# Patient Record
Sex: MALE | Race: WHITE | NOT HISPANIC OR LATINO | URBAN - METROPOLITAN AREA
[De-identification: names, ages, dates, MRNs, and addresses within clinical notes are randomized per-mention and may not be internally consistent; named-entity substitution may affect disease eponyms.]

---

## 2021-08-31 ENCOUNTER — INPATIENT (INPATIENT)
Facility: HOSPITAL | Age: 26
LOS: 7 days | Discharge: HOME | End: 2021-09-08
Attending: STUDENT IN AN ORGANIZED HEALTH CARE EDUCATION/TRAINING PROGRAM | Admitting: PSYCHIATRY & NEUROLOGY
Payer: MEDICAID

## 2021-08-31 ENCOUNTER — OUTPATIENT (OUTPATIENT)
Dept: OUTPATIENT SERVICES | Facility: HOSPITAL | Age: 26
LOS: 1 days | Discharge: STILL ADMITTED OR ADMINISTRATIVE | End: 2021-08-31
Payer: COMMERCIAL

## 2021-08-31 VITALS
TEMPERATURE: 98 F | OXYGEN SATURATION: 97 % | HEART RATE: 86 BPM | RESPIRATION RATE: 20 BRPM | DIASTOLIC BLOOD PRESSURE: 74 MMHG | SYSTOLIC BLOOD PRESSURE: 148 MMHG

## 2021-08-31 DIAGNOSIS — F06.1 CATATONIC DISORDER DUE TO KNOWN PHYSIOLOGICAL CONDITION: ICD-10-CM

## 2021-08-31 DIAGNOSIS — F29 UNSPECIFIED PSYCHOSIS NOT DUE TO A SUBSTANCE OR KNOWN PHYSIOLOGICAL CONDITION: ICD-10-CM

## 2021-08-31 LAB
ALBUMIN SERPL ELPH-MCNC: 4.8 G/DL — SIGNIFICANT CHANGE UP (ref 3.3–5)
ALP SERPL-CCNC: 69 U/L — SIGNIFICANT CHANGE UP (ref 40–120)
ALT FLD-CCNC: 25 U/L — SIGNIFICANT CHANGE UP (ref 4–41)
AMPHET UR-MCNC: NEGATIVE — SIGNIFICANT CHANGE UP
ANION GAP SERPL CALC-SCNC: 15 MMOL/L — HIGH (ref 7–14)
APAP SERPL-MCNC: <15 UG/ML — SIGNIFICANT CHANGE UP (ref 15–25)
APPEARANCE UR: CLEAR — SIGNIFICANT CHANGE UP
AST SERPL-CCNC: 19 U/L — SIGNIFICANT CHANGE UP (ref 4–40)
BARBITURATES UR SCN-MCNC: NEGATIVE — SIGNIFICANT CHANGE UP
BASOPHILS # BLD AUTO: 0.02 K/UL — SIGNIFICANT CHANGE UP (ref 0–0.2)
BASOPHILS NFR BLD AUTO: 0.2 % — SIGNIFICANT CHANGE UP (ref 0–2)
BENZODIAZ UR-MCNC: NEGATIVE — SIGNIFICANT CHANGE UP
BILIRUB SERPL-MCNC: 0.2 MG/DL — SIGNIFICANT CHANGE UP (ref 0.2–1.2)
BILIRUB UR-MCNC: NEGATIVE — SIGNIFICANT CHANGE UP
BUN SERPL-MCNC: 15 MG/DL — SIGNIFICANT CHANGE UP (ref 7–23)
CALCIUM SERPL-MCNC: 9.6 MG/DL — SIGNIFICANT CHANGE UP (ref 8.4–10.5)
CHLORIDE SERPL-SCNC: 102 MMOL/L — SIGNIFICANT CHANGE UP (ref 98–107)
CK SERPL-CCNC: 109 U/L — SIGNIFICANT CHANGE UP (ref 30–200)
CO2 SERPL-SCNC: 22 MMOL/L — SIGNIFICANT CHANGE UP (ref 22–31)
COCAINE METAB.OTHER UR-MCNC: NEGATIVE — SIGNIFICANT CHANGE UP
COLOR SPEC: YELLOW — SIGNIFICANT CHANGE UP
CREAT SERPL-MCNC: 0.85 MG/DL — SIGNIFICANT CHANGE UP (ref 0.5–1.3)
CREATININE URINE RESULT, DAU: 123 MG/DL — SIGNIFICANT CHANGE UP
DIFF PNL FLD: NEGATIVE — SIGNIFICANT CHANGE UP
EOSINOPHIL # BLD AUTO: 0.09 K/UL — SIGNIFICANT CHANGE UP (ref 0–0.5)
EOSINOPHIL NFR BLD AUTO: 0.8 % — SIGNIFICANT CHANGE UP (ref 0–6)
ETHANOL SERPL-MCNC: <10 MG/DL — SIGNIFICANT CHANGE UP
GLUCOSE SERPL-MCNC: 113 MG/DL — HIGH (ref 70–99)
GLUCOSE UR QL: NEGATIVE — SIGNIFICANT CHANGE UP
HCT VFR BLD CALC: 40.3 % — SIGNIFICANT CHANGE UP (ref 39–50)
HGB BLD-MCNC: 13.8 G/DL — SIGNIFICANT CHANGE UP (ref 13–17)
IANC: 7.34 K/UL — SIGNIFICANT CHANGE UP (ref 1.5–8.5)
IMM GRANULOCYTES NFR BLD AUTO: 0.4 % — SIGNIFICANT CHANGE UP (ref 0–1.5)
KETONES UR-MCNC: NEGATIVE — SIGNIFICANT CHANGE UP
LEUKOCYTE ESTERASE UR-ACNC: NEGATIVE — SIGNIFICANT CHANGE UP
LYMPHOCYTES # BLD AUTO: 2.92 K/UL — SIGNIFICANT CHANGE UP (ref 1–3.3)
LYMPHOCYTES # BLD AUTO: 26 % — SIGNIFICANT CHANGE UP (ref 13–44)
MCHC RBC-ENTMCNC: 29.5 PG — SIGNIFICANT CHANGE UP (ref 27–34)
MCHC RBC-ENTMCNC: 34.2 GM/DL — SIGNIFICANT CHANGE UP (ref 32–36)
MCV RBC AUTO: 86.1 FL — SIGNIFICANT CHANGE UP (ref 80–100)
METHADONE UR-MCNC: NEGATIVE — SIGNIFICANT CHANGE UP
MONOCYTES # BLD AUTO: 0.84 K/UL — SIGNIFICANT CHANGE UP (ref 0–0.9)
MONOCYTES NFR BLD AUTO: 7.5 % — SIGNIFICANT CHANGE UP (ref 2–14)
NEUTROPHILS # BLD AUTO: 7.34 K/UL — SIGNIFICANT CHANGE UP (ref 1.8–7.4)
NEUTROPHILS NFR BLD AUTO: 65.1 % — SIGNIFICANT CHANGE UP (ref 43–77)
NITRITE UR-MCNC: NEGATIVE — SIGNIFICANT CHANGE UP
NRBC # BLD: 0 /100 WBCS — SIGNIFICANT CHANGE UP
NRBC # FLD: 0 K/UL — SIGNIFICANT CHANGE UP
OPIATES UR-MCNC: NEGATIVE — SIGNIFICANT CHANGE UP
OXYCODONE UR-MCNC: NEGATIVE — SIGNIFICANT CHANGE UP
PCP SPEC-MCNC: SIGNIFICANT CHANGE UP
PCP UR-MCNC: NEGATIVE — SIGNIFICANT CHANGE UP
PH UR: 6 — SIGNIFICANT CHANGE UP (ref 5–8)
PLATELET # BLD AUTO: 291 K/UL — SIGNIFICANT CHANGE UP (ref 150–400)
POTASSIUM SERPL-MCNC: 4 MMOL/L — SIGNIFICANT CHANGE UP (ref 3.5–5.3)
POTASSIUM SERPL-SCNC: 4 MMOL/L — SIGNIFICANT CHANGE UP (ref 3.5–5.3)
PROT SERPL-MCNC: 7.5 G/DL — SIGNIFICANT CHANGE UP (ref 6–8.3)
PROT UR-MCNC: NEGATIVE — SIGNIFICANT CHANGE UP
RBC # BLD: 4.68 M/UL — SIGNIFICANT CHANGE UP (ref 4.2–5.8)
RBC # FLD: 12.9 % — SIGNIFICANT CHANGE UP (ref 10.3–14.5)
SALICYLATES SERPL-MCNC: <5 MG/DL — LOW (ref 15–30)
SARS-COV-2 RNA SPEC QL NAA+PROBE: SIGNIFICANT CHANGE UP
SODIUM SERPL-SCNC: 139 MMOL/L — SIGNIFICANT CHANGE UP (ref 135–145)
SP GR SPEC: 1.02 — SIGNIFICANT CHANGE UP (ref 1–1.05)
THC UR QL: NEGATIVE — SIGNIFICANT CHANGE UP
TOXICOLOGY SCREEN, DRUGS OF ABUSE, SERUM RESULT: SIGNIFICANT CHANGE UP
TSH SERPL-MCNC: 2.93 UIU/ML — SIGNIFICANT CHANGE UP (ref 0.27–4.2)
UROBILINOGEN FLD QL: SIGNIFICANT CHANGE UP
WBC # BLD: 11.25 K/UL — HIGH (ref 3.8–10.5)
WBC # FLD AUTO: 11.25 K/UL — HIGH (ref 3.8–10.5)

## 2021-08-31 PROCEDURE — 99285 EMERGENCY DEPT VISIT HI MDM: CPT

## 2021-08-31 PROCEDURE — 99285 EMERGENCY DEPT VISIT HI MDM: CPT | Mod: 25

## 2021-08-31 PROCEDURE — 93010 ELECTROCARDIOGRAM REPORT: CPT

## 2021-08-31 PROCEDURE — 70450 CT HEAD/BRAIN W/O DYE: CPT | Mod: 26

## 2021-08-31 RX ORDER — HALOPERIDOL DECANOATE 100 MG/ML
5 INJECTION INTRAMUSCULAR EVERY 6 HOURS
Refills: 0 | Status: DISCONTINUED | OUTPATIENT
Start: 2021-08-31 | End: 2021-09-08

## 2021-08-31 RX ORDER — HALOPERIDOL DECANOATE 100 MG/ML
5 INJECTION INTRAMUSCULAR ONCE
Refills: 0 | Status: DISCONTINUED | OUTPATIENT
Start: 2021-08-31 | End: 2021-09-08

## 2021-08-31 RX ORDER — OLANZAPINE 15 MG/1
5 TABLET, FILM COATED ORAL AT BEDTIME
Refills: 0 | Status: DISCONTINUED | OUTPATIENT
Start: 2021-08-31 | End: 2021-09-03

## 2021-08-31 RX ADMIN — Medication 2 MILLIGRAM(S): at 17:31

## 2021-08-31 RX ADMIN — Medication 2 MILLIGRAM(S): at 18:39

## 2021-08-31 RX ADMIN — OLANZAPINE 5 MILLIGRAM(S): 15 TABLET, FILM COATED ORAL at 21:38

## 2021-08-31 RX ADMIN — Medication 2 MILLIGRAM(S): at 23:30

## 2021-08-31 NOTE — ED ADULT NURSE NOTE - OBJECTIVE STATEMENT
Break RN: pt received to  due to catatonia. Pt non-verbal at this time, refusing to answer any questions. Pt able to follow commands, calm/cooperative at this time. Pt is outpatient [facility] pt with hx of catatonia and SI. Pt appears comfortable, in NAD, respirations even/unlabored, flat affect noted. Report endorsed to primary RN Hung, labs to be drawn, pt to be medicated as per orders.

## 2021-08-31 NOTE — ED BEHAVIORAL HEALTH ASSESSMENT NOTE - LEGAL HISTORY
multiple arrests for driving recklessly, possession of marijuana, resisting arrest while manic Open Court Case - Sept 21 - [place], Oct 4 - [place]

## 2021-08-31 NOTE — ED BEHAVIORAL HEALTH ASSESSMENT NOTE - DESCRIPTION
none both parents , staring straight ahead. Exhibiting waxy flexibility. mutism, staring blankly. Received Ativan 2mg IM.   Vital Signs Last 24 Hrs  T(C): 36.6 (31 Aug 2021 16:09), Max: 36.6 (31 Aug 2021 16:09)  T(F): 97.8 (31 Aug 2021 16:09), Max: 97.8 (31 Aug 2021 16:09)  HR: 86 (31 Aug 2021 16:09) (86 - 86)  BP: 148/74 (31 Aug 2021 16:09) (148/74 - 148/74)  BP(mean): --  RR: 20 (31 Aug 2021 16:09) (20 - 20)  SpO2: 97% (31 Aug 2021 16:09) (97% - 97%) staring straight ahead. Exhibiting waxy flexibility. mutism, staring blankly. Received Ativan 2mg IM 530pm. Ativan 2mg po at 740pm.   Vital Signs Last 24 Hrs  T(C): 36.6 (31 Aug 2021 16:09), Max: 36.6 (31 Aug 2021 16:09)  T(F): 97.8 (31 Aug 2021 16:09), Max: 97.8 (31 Aug 2021 16:09)  HR: 86 (31 Aug 2021 16:09) (86 - 86)  BP: 148/74 (31 Aug 2021 16:09) (148/74 - 148/74)  BP(mean): --  RR: 20 (31 Aug 2021 16:09) (20 - 20)  SpO2: 97% (31 Aug 2021 16:09) (97% - 97%)    KESSLER-JOAQUIN CATATONIA RATING SCALE - 13

## 2021-08-31 NOTE — ED BEHAVIORAL HEALTH ASSESSMENT NOTE - NSACTIVEVENT_PSY_ALL_CORE
Triggering events leading to humiliation, shame, and/or despair (e.g., Loss of relationship, financial or health status) (real or anticipated)/Substance intoxication or withdrawal/Pending incarceration or homelessness

## 2021-08-31 NOTE — ED BEHAVIORAL HEALTH ASSESSMENT NOTE - HPI (INCLUDE ILLNESS QUALITY, SEVERITY, DURATION, TIMING, CONTEXT, MODIFYING FACTORS, ASSOCIATED SIGNS AND SYMPTOMS)
25 year old  M, domiciled alone in [place], non-caregiver, with no formal diagnosed psych history but has taken psych meds while incarcerated, possible past psych hospitalization in [place], no past SA/SI, +cannabis use, +alcohol use (none since incarceration), BIB EMS activated by the crisis clinic for catatonia evaluation.     Patient seen in intake and stared at writer. He did not answer any questions and did not blink. He was able to follow commands "change your clothes, empty out your pockets" and slowly walked to the back of the  area.   Ativan 2mg IM was ordered and given. Will reassess after dose. Collateral from Crisis Clinic and Aunt provides most information.     Laura - Crisis Clinic: When patient came in he was very rigid and looked catatonic. he wouldn't engage in english or Turkmen. Family said since he's been out of correction (was there for 6 weeks in [place]). He left one night with the car and drove and was pulled over for driving recklessly. For the last 3-4 days he looked like a vegetable. he wasn't responding to his aunt even while in the crisis clinic. He has been physically aggressive, talking about odd topics. Yesterday he was saying he wanted to throw himself out of a balcony window. Grandmother has to walk him. Usually lives alone in [place] after finishing College. He was learning Sinhala but noticed a change in behavior in the last year. Never has sought treatment in the past. Was taking Zyprexa 10mg and Hydroxyzine from correction.    Rosalind (aunt)  - Pt finished [employer] and in the last year all his strange behaviors started. He used to leave the house alone, wanted to steal families money and spend outrageous amounts. He ended up in correction in [place] bc he was in possession of marijuana and police resist and driving recklessly but family got him out after a few days in Oct 2021. He continued to exhibit bizarre behaviors and would email friends homicidal threats and post strange things on facebook. He also accumulated over 40K in debt and family does not know how. He had also traveled around the country a lot and once on facebook he posted psychiatric pills in the past from [place] (ended up medically hospitalized in [place]) - 2021. Pt recently went back to correction for the second time abt 8 weeks ago as he would drive recklessly while psychotic but family left him in there for 1.5 months. They only went to get him when they noticed he stopped calling them so they were concerned. He was hearing voices in the last year as well and was hitting the walls and was aggressive. He's been in a catatonic state for the last 3 days. Not saying anything. He was sleeping a lot, eating and drinking only a little bit.  Mom  in  and dad was not in the picture but his family would check in on him while he was in [place]. Aunt thinks that maybe in 2019 something happened with a girlfriend and with the pandemic he was somehow triggered. Aunt states he's been smoking a lot of marijuana and oil, drinking alcohol (vodka, tequila). Patient said 2 days ago that he wanted to jump out the window at Grandmothers house. Grandmother got him into the room and watched him for the last 2 days prior to bringing him to [facility].   Fam hx: maternal grandfather sisters - aunt - Schizophrenia. The only reason that family went to pick him up was because he stopped calling from correction. [place] - Hoarding, living in squalor with rats and mouses but patient did not want to return to [place].   Open Court Case -  - [place], Oct 4 - [place]   COVID - Never had covid. Vaccine - J&J - 21. Travel - returned 21 25 year old  M, domiciled alone in [place], non-caregiver, with no formal diagnosed psych history but has taken psych meds while incarcerated, possible past psych hospitalization in [place], no past SA/SI, +cannabis use, +alcohol use (none since incarceration), BIB EMS activated by the crisis clinic for catatonia evaluation.     Patient seen in intake and stared at writer. He did not answer any questions and did not blink. He was able to follow commands "change your clothes, empty out your pockets" and slowly walked to the back of the  area.   Ativan 2mg IM was ordered and given. Will reassess after dose. Collateral from Crisis Clinic and Aunt provides most information.     Attempted to evaluate patient again at 630pm. Patient continues to not be able to speak and looks around the ED. He was able to nod his head slightly after being asked if he was hearing voices.   Requested to give patient another 2mg po of Ativan.     Laura - Crisis Clinic: When patient came in he was very rigid and looked catatonic. he wouldn't engage in english or Panamanian. Family said since he's been out of care home (was there for 6 weeks in [place]). He left one night with the car and drove and was pulled over for driving recklessly. For the last 3-4 days he looked like a vegetable. he wasn't responding to his aunt even while in the crisis clinic. He has been physically aggressive, talking about odd topics. Yesterday he was saying he wanted to throw himself out of a balcony window. Grandmother has to walk him. Usually lives alone in [place] after finishing College. He was learning Prydeinig but noticed a change in behavior in the last year. Never has sought treatment in the past. Was taking Zyprexa 10mg and Hydroxyzine from care home.    Rosalind (aunt)  - Pt finished [employer] and in the last year all his strange behaviors started. He used to leave the house alone, wanted to steal families money and spend outrageous amounts. He ended up in care home in [place] bc he was in possession of marijuana and police resist and driving recklessly but family got him out after a few days in Oct 2021. He continued to exhibit bizarre behaviors and would email friends homicidal threats and post strange things on facebook. He also accumulated over 40K in debt and family does not know how. He had also traveled around the country a lot and once on facebook he posted psychiatric pills in the past from [place] (ended up medically hospitalized in [place]) - 2021. Pt recently went back to care home for the second time abt 8 weeks ago as he would drive recklessly while psychotic but family left him in there for 1.5 months. They only went to get him when they noticed he stopped calling them so they were concerned. He was hearing voices in the last year as well and was hitting the walls and was aggressive. He's been in a catatonic state for the last 3 days. Not saying anything. He was sleeping a lot, eating and drinking only a little bit.  Mom  in  and dad was not in the picture but his family would check in on him while he was in [place]. Aunt thinks that maybe in  something happened with a girlfriend and with the pandemic he was somehow triggered. Aunt states he's been smoking a lot of marijuana and oil, drinking alcohol (vodka, tequila). Patient said 2 days ago that he wanted to jump out the window at Grandmothers house. Grandmother got him into the room and watched him for the last 2 days prior to bringing him to [facility].   Fam hx: maternal grandfather sisters - aunt - Schizophrenia. The only reason that family went to pick him up was because he stopped calling from care home. [place] - Hoarding, living [place] [facility] with rats and mouses but patient did not want to return to [place].   Open Court Case -  - [place], Oct 4 - [place]   COVID - Never had covid. Vaccine - J&J - 21. Travel - returned 21  Discussed with aunt after evaluation again - She states she did not want to talk so freely in front of his grandmother but she states that in the last two days he actually has talked and has been having some conversations with people. This morning he even asked his aunt "are you bringing me to the hospital to leave me there". Aunt thinks that there may be some chance this is behavioral to maybe evade care home as he is very smart "with a very high IQ".

## 2021-08-31 NOTE — ED ADULT NURSE NOTE - NSIMPLEMENTINTERV_GEN_ALL_ED
Implemented All Fall Risk Interventions:  [place] to call system. Call bell, personal items and telephone within reach. Instruct patient to call for assistance. Room bathroom lighting operational. Non-slip footwear when patient is off stretcher. Physically safe environment: no spills, clutter or unnecessary equipment. Stretcher in lowest position, wheels locked, appropriate side rails in place. Provide visual cue, wrist band, yellow gown, etc. Monitor gait and stability. Monitor for mental status changes and reorient to person, place, and time. Review medications for side effects contributing to fall risk. Reinforce activity limits and safety measures with patient and family.

## 2021-08-31 NOTE — ED BEHAVIORAL HEALTH ASSESSMENT NOTE - RISK ASSESSMENT
Moderate Acute Suicide Risk elevated risk of harm given acute psychosis, catatonic, not eating/drinking, not adherent with meds, recent substance use, active legal issues, not caring for himself.   protective factors include good social support and has access to health care.   At this time he is not functioning at baseline and will require psych hospitalization for stabilization.

## 2021-08-31 NOTE — ED PROVIDER NOTE - OBJECTIVE STATEMENT
This is a 25 yr old , [facility] catatonia, with c/o sever catatonia, This is a 25 yr old M, [facility] catatonia, with c/o sever catatonia, si. Pt went to out patient [facility], where he was not able to participate in psychiatric assessment. Hands off report given by Tim NJ, via teams pta. Pt recently was incarcerated in another states due to reckless driving under the influence. Family complained of chaotic bizarre behaviour, and yesterday he made a statement wanted to throw himself off the balcony.   Pt upon arrival alert oriented calm cooperative, follow directions, but not engaging and not talking.

## 2021-08-31 NOTE — ED BEHAVIORAL HEALTH NOTE - BEHAVIORAL HEALTH NOTE
COVID Exposure Screen- collateral (i.e. third-party, chart review, belongings, etc; include EMS and ED staff)  1.	*Has the patient had a COVID-19 test in the last 90 days?  (  ) Yes   (  ) No   (  x) Unknown- Reason: _____  IF YES PROCEED TO QUESTION #2. IF NO OR UNKNOWN, PLEASE SKIP TO QUESTION #3.  2.	Date of test(s) and result(s): ________  3.	*Has the patient tested positive for COVID-19 antibodies? (  ) Yes   (x  ) No   (  ) Unknown- Reason: _____  IF YES PROCEED TO QUESTION #4. IF NO or UNKNOWN, PLEASE SKIP TO QUESTION #5.  4.	Date of positive antibody test: ________  5.	*Has the patient received 2 doses of the COVID-19 vaccine? (  x) Yes   (  ) No   (  ) Unknown- Reason: _____  IF YES PROCEED TO QUESTION #6. IF NO or UNKNOWN, PLEASE SKIP TO QUESTION #7.  6.	 Date of second dose: ___[facility] - 08/20/21_____  7.	*In the past 10 days, has the patient been around anyone with a positive COVID-19 test?* (  ) Yes   ( X ) No   (  ) Unknown- Reason: __  IF YES PROCEED TO QUESTION #8. IF NO or UNKNOWN, PLEASE SKIP TO QUESTION #13.  8.	Was the patient within 6 feet of them for at least 15 minutes? (  ) Yes   (  ) No   (  ) Unknown- Reason: _____  9.	Did the patient provide care for them? (  ) Yes   (  ) No   (  ) Unknown- Reason: ______  10.	Did the patient have direct physical contact with them (touched, hugged, or kissed them)? (  ) Yes   (  ) No    (  ) Unknown- Reason: __  11.	Did the patient share eating or drinking utensils with them? (  ) Yes   (  ) No    (  ) Unknown- Reason: ____  12.	Did they sneeze, cough, or somehow get respiratory droplets on the patient? (  ) Yes   (  ) No    (  ) Unknown- Reason: ______  13.	*Has the patient been out of [place] within the past 10 days?* (X  ) Yes   (  ) No   (  ) Unknown- Reason: _____  IF YES PLEASE ANSWER THE FOLLOWING QUESTIONS:  14.	Which state/country did they go to? _[place]_____  15.	Were they there over 24 hours? ( X ) Yes   (  ) No    (  ) Unknown- Reason: ______  16.	Date of return to [facility]: __08/28/21____

## 2021-08-31 NOTE — ED BEHAVIORAL HEALTH ASSESSMENT NOTE - PSYCHIATRIC ISSUES AND PLAN (INCLUDE STANDING AND PRN MEDICATION)
Ativan 2mg po q6hrs STANDING FOR CATATONIA. Zyprexa 5mg po qhs. PRNS: Haldol 5mg po/im q6hrs, ativan 2mg po/im q6hrs PRN

## 2021-08-31 NOTE — ED PROVIDER NOTE - CLINICAL SUMMARY MEDICAL DECISION MAKING FREE TEXT BOX
This is a 25 yr old M, [facility] catatonia, with c/o sever catatonia, si. Pt went to out patient [facility], where he was not able to participate in psychiatric assessment. Hands off report given by Tim NJ, via teams pta. Pt recently was incarcerated in another states due to reckless driving under the influence. Family complained of chaotic bizarre behaviour, and yesterday he made a statement wanted to throw himself off the balcony.   Pt upon arrival alert oriented calm cooperative, follow directions, but not engaging and not talking.  Labs- wnl  psych- inpatient tx

## 2021-08-31 NOTE — ED BEHAVIORAL HEALTH ASSESSMENT NOTE - OTHER PAST PSYCHIATRIC HISTORY (INCLUDE DETAILS REGARDING ONSET, COURSE OF ILLNESS, INPATIENT/OUTPATIENT TREATMENT)
no formal psych diagnosis but was given Zyprexa 10mg in halfway and Hydroxyzine.   Possibly hospitalized in [place].

## 2021-08-31 NOTE — ED BEHAVIORAL HEALTH ASSESSMENT NOTE - DETAILS
none has been physically aggressive at home and hitting walls maternal aunt - schizophrenia Maribel (aunt) and Laura in [facility] notified ABIGAIL - Dr. Hong

## 2021-09-01 LAB
A1C WITH ESTIMATED AVERAGE GLUCOSE RESULT: 5.2 % — SIGNIFICANT CHANGE UP (ref 4–5.6)
CHOLEST SERPL-MCNC: 168 MG/DL — SIGNIFICANT CHANGE UP
COVID-19 SPIKE DOMAIN AB INTERP: POSITIVE
COVID-19 SPIKE DOMAIN ANTIBODY RESULT: >250 U/ML — HIGH
ESTIMATED AVERAGE GLUCOSE: 103 — SIGNIFICANT CHANGE UP
HDLC SERPL-MCNC: 37 MG/DL — LOW
LIPID PNL WITH DIRECT LDL SERPL: 116 MG/DL — HIGH
NON HDL CHOLESTEROL: 131 MG/DL — HIGH
SARS-COV-2 IGG+IGM SERPL QL IA: >250 U/ML — HIGH
SARS-COV-2 IGG+IGM SERPL QL IA: POSITIVE
TRIGL SERPL-MCNC: 73 MG/DL — SIGNIFICANT CHANGE UP

## 2021-09-01 PROCEDURE — 90853 GROUP PSYCHOTHERAPY: CPT

## 2021-09-01 PROCEDURE — 99223 1ST HOSP IP/OBS HIGH 75: CPT | Mod: 25

## 2021-09-01 RX ORDER — ACETAMINOPHEN 500 MG
650 TABLET ORAL ONCE
Refills: 0 | Status: COMPLETED | OUTPATIENT
Start: 2021-09-01 | End: 2021-09-01

## 2021-09-01 RX ADMIN — OLANZAPINE 5 MILLIGRAM(S): 15 TABLET, FILM COATED ORAL at 22:16

## 2021-09-01 RX ADMIN — Medication 650 MILLIGRAM(S): at 22:35

## 2021-09-01 RX ADMIN — Medication 2 MILLIGRAM(S): at 13:11

## 2021-09-01 RX ADMIN — Medication 2 MILLIGRAM(S): at 23:50

## 2021-09-01 RX ADMIN — Medication 650 MILLIGRAM(S): at 23:20

## 2021-09-01 RX ADMIN — Medication 2 MILLIGRAM(S): at 06:01

## 2021-09-01 NOTE — BH INPATIENT PSYCHIATRY ASSESSMENT NOTE - NSBHCRANIAL_PSY_ALL_CORE
Recognizes 2 fingers or can read (II)/Opens mouth, sticks out tongue (V, XII)/EOMI (III, IV, VI)/Shoulder shrug (XI)/Hearing intact (VIII)

## 2021-09-01 NOTE — BH INPATIENT PSYCHIATRY ASSESSMENT NOTE - NSPRESENTSXS_PSY_ALL_CORE
Problem: Potential for Falls  Goal: Patient will remain free of falls  Description: INTERVENTIONS:  - Assess patient frequently for physical needs  -  Identify cognitive and physical deficits and behaviors that affect risk of falls    -  Kramer fall precautions as indicated by assessment   - Educate patient/family on patient safety including physical limitations  - Instruct patient to call for assistance with activity based on assessment  - Modify environment to reduce risk of injury  - Consider OT/PT consult to assist with strengthening/mobility  Outcome: Progressing
Psychosis/Impulsivity/Global insomnia/Severe anxiety, agitation or panic

## 2021-09-01 NOTE — BH SOCIAL WORK INITIAL PSYCHOSOCIAL EVALUATION - NSHIGHRISKBEHFT_PSY_ALL_CORE
As per ED notes, pt has hx of arrest, recently released from snf [place]. Pt is currently not speaking during interview, and unwilling to sign consent  to speak with family.

## 2021-09-01 NOTE — BH INPATIENT PSYCHIATRY ASSESSMENT NOTE - OTHER PAST PSYCHIATRIC HISTORY (INCLUDE DETAILS REGARDING ONSET, COURSE OF ILLNESS, INPATIENT/OUTPATIENT TREATMENT)
no formal psych diagnosis but was given Zyprexa 10mg in long-term and Hydroxyzine.   Possibly hospitalized in [place].

## 2021-09-01 NOTE — BH INPATIENT PSYCHIATRY ASSESSMENT NOTE - NSBHASSESSSUMMFT_PSY_ALL_CORE
Patient is a 25-year-old male with no past psychiatric history who comes into the hospital for catatonia for three days, referred by the crisis clinic. Over the past months, the patient has exhibited bizarre behavior which have caused him to be incarcerated, including reckless driving and possession of marijuana. He was released just six weeks ago. The patient was previously highly functional, having graduated [employer]. The patient has been acting in a bizarre way for the past several months, driving recklessly, hoarding, and living in squalid conditions. This is in the context of heavy cannabis and alcohol use.  SH: Graduated [employer]. Lives in [facility] [place], unemployed. Lives near aunt. FH of schizophrenia.  PsyHx: Cannabis/alcohol use, past psych hospitalization.     1. Admission status  9.39 (Emergency Admission, extension complete, expires on 9/15)    2. Significant lab findings  WBC 11.25    3. Psychotropic medications  - Lorazepam 2 mg QID (catatonia)  	- Started 8/31  - Olanzapine 5 mg QHS (psychosis)  	- Started 8/31    4. Medical conditions, other medications, consults  None    5. Psychosocial interventions  - Family contacted: ROBERTA

## 2021-09-01 NOTE — BH INPATIENT PSYCHIATRY ASSESSMENT NOTE - LEGAL HISTORY
multiple arrests for driving recklessly, possession of marijuana, resisting arrest while manic Open Court Case - Sept 21 - [place], Oct 4 - [place]

## 2021-09-01 NOTE — BH INPATIENT PSYCHIATRY ASSESSMENT NOTE - NSBHMETABOLIC_PSY_ALL_CORE_FT
BMI: BMI (kg/m2): 28.4 (08-31-21 @ 21:42)  HbA1c: A1C with Estimated Average Glucose Result: 5.2 % (09-01-21 @ 09:31)    Glucose:   BP: 106/65 (09-01-21 @ 07:59) (106/65 - 148/74)  Lipid Panel: Date/Time: 09-01-21 @ 09:31  Cholesterol, Serum: 168  Direct LDL: --  HDL Cholesterol, Serum: 37  Total Cholesterol/HDL Ration Measurement: --  Triglycerides, Serum: 73

## 2021-09-01 NOTE — PSYCHIATRIC REHAB INITIAL EVALUATION - NSBHPRRECOMMEND_PSY_ALL_CORE
Writer met with patient to orient him to psychiatric rehabilitation staff and services. Throughout the session, the patient was uncooperative, mute, and guarded with personal history. Patient responded to writer's introductory questions with heads nods and shrugging of his shoulders. Per chart, patient presents with polysubstance misuse (i.e. alcohol, cannabis), psychosis (i.e reckless driving, impulsivity, catatonic behavior), aggression, SI, and HI. Patient has a history of 2 incarcerations for possession of cannabis, reckless driving, and police resistance. Lenr was unable to assess SI, HI, AH, and VH. Lenr established a treatment goal for the patient to work on over the next 7 days. Psychiatric Rehabilitation staff will utilize group and individual psychotherapy, and psychoeducation to assist the patient in reaching his treatment goal.

## 2021-09-01 NOTE — BH INPATIENT PSYCHIATRY ASSESSMENT NOTE - NSBHCHARTREVIEWVS_PSY_A_CORE FT
Vital Signs Last 24 Hrs  T(C): 36.7 (09-01-21 @ 07:59), Max: 36.8 (08-31-21 @ 21:42)  T(F): 98 (09-01-21 @ 07:59), Max: 98.2 (08-31-21 @ 21:42)  HR: 103 (08-31-21 @ 20:40) (86 - 103)  BP: 106/65 (09-01-21 @ 07:59) (106/65 - 148/74)  BP(mean): 114 (09-01-21 @ 07:59) (114 - 114)  RR: 18 (08-31-21 @ 20:40) (18 - 20)  SpO2: 99% (08-31-21 @ 20:40) (97% - 99%)    Orthostatic VS  08-31-21 @ 21:42  Lying BP: --/-- HR: --  Sitting BP: 133/74 HR: 100  Standing BP: 128/72 HR: 107  Site: --  Mode: --

## 2021-09-01 NOTE — BH INPATIENT PSYCHIATRY ASSESSMENT NOTE - CURRENT MEDICATION
MEDICATIONS  (STANDING):  LORazepam     Tablet 2 milliGRAM(s) Oral every 6 hours  OLANZapine 5 milliGRAM(s) Oral at bedtime    MEDICATIONS  (PRN):  haloperidol     Tablet 5 milliGRAM(s) Oral every 6 hours PRN agitation  haloperidol    Injectable 5 milliGRAM(s) IntraMuscular Once PRN severe agitation  LORazepam   Injectable 2 milliGRAM(s) IntraMuscular Once PRN severe agitation

## 2021-09-01 NOTE — BH INPATIENT PSYCHIATRY ASSESSMENT NOTE - RISK ASSESSMENT
elevated risk of harm given acute psychosis, catatonic, not eating/drinking, not adherent with meds, recent substance use, active legal issues, not caring for himself.   protective factors include good social support and has access to health care.   At this time he is not functioning at baseline and will require psych hospitalization for stabilization.

## 2021-09-01 NOTE — BH INPATIENT PSYCHIATRY ASSESSMENT NOTE - NSBHSAALC_PSY_A_CORE FT
almost daily prior to incarceration 2 months ago- but recently incarcerated so has not drank >6 weeks

## 2021-09-01 NOTE — BH INPATIENT PSYCHIATRY ASSESSMENT NOTE - HPI (INCLUDE ILLNESS QUALITY, SEVERITY, DURATION, TIMING, CONTEXT, MODIFYING FACTORS, ASSOCIATED SIGNS AND SYMPTOMS)
From admission interview:  Patient is seen in the day room, in no acute distress, amenable to interview. Patient is nonverbal, but is able to nod or shake his head in response to questions. Denies any acute issues. No SI/HI/AVH. Patient was offered the opportunity to write in response to questions, but he declines to do so. No catalepsy, posturing, staring or other symptoms of catatonia noted.    From ED consult:  25 year old  M, domiciled alone in [place], non-caregiver, with no formal diagnosed psych history but has taken psych meds while incarcerated, possible past psych hospitalization in [place], no past SA/SI, +cannabis use, +alcohol use (none since incarceration), BIB EMS activated by the crisis clinic for catatonia evaluation.     Patient seen in intake and stared at writer. He did not answer any questions and did not blink. He was able to follow commands "change your clothes, empty out your pockets" and slowly walked to the back of the  area.   Ativan 2mg IM was ordered and given. Will reassess after dose. Collateral from Crisis Clinic and Aunt provides most information.     Attempted to evaluate patient again at 630pm. Patient continues to not be able to speak and looks around the ED. He was able to nod his head slightly after being asked if he was hearing voices.   Requested to give patient another 2mg po of Ativan.     Laura - Crisis Clinic: When patient came in he was very rigid and looked catatonic. he wouldn't engage in english or Qatari. Family said since he's been out of detention (was there for 6 weeks in [place]). He left one night with the car and drove and was pulled over for driving recklessly. For the last 3-4 days he looked like a vegetable. he wasn't responding to his aunt even while in the crisis clinic. He has been physically aggressive, talking about odd topics. Yesterday he was saying he wanted to throw himself out of a balcony window. Grandmother has to walk him. Usually lives alone in [place] after finishing College. He was learning Welsh but noticed a change in behavior in the last year. Never has sought treatment in the past. Was taking Zyprexa 10mg and Hydroxyzine from detention.    Rosalind (aunt)  - Pt finished [employer] and in the last year all his strange behaviors started. He used to leave the house alone, wanted to steal families money and spend outrageous amounts. He ended up in detention in [place] bc he was in possession of marijuana and police resist and driving recklessly but family got him out after a few days in Oct 2021. He continued to exhibit bizarre behaviors and would email friends homicidal threats and post strange things on facebook. He also accumulated over 40K in debt and family does not know how. He had also traveled around the country a lot and once on facebook he posted psychiatric pills in the past from [place] (ended up medically hospitalized in [place]) - 2021. Pt recently went back to detention for the second time abt 8 weeks ago as he would drive recklessly while psychotic but family left him in there for 1.5 months. They only went to get him when they noticed he stopped calling them so they were concerned. He was hearing voices in the last year as well and was hitting the walls and was aggressive. He's been in a catatonic state for the last 3 days. Not saying anything. He was sleeping a lot, eating and drinking only a little bit.  Mom  in  and dad was not in the picture but his family would check in on him while he was in [place]. Aunt thinks that maybe in  something happened with a girlfriend and with the pandemic he was somehow triggered. Aunt states he's been smoking a lot of marijuana and oil, drinking alcohol (vodka, tequila). Patient said 2 days ago that he wanted to jump out the window at Grandmothers house. Grandmother got him into the room and watched him for the last 2 days prior to bringing him to [facility].   Fam hx: maternal grandfather sisters - aunt - Schizophrenia. The only reason that family went to pick him up was because he stopped calling from detention. [place] - Hoarding, living in squalor with rats and mouses but patient did not want to return to [place].   Open Court Case -  - [place], Oct 4 - [place]   COVID - Never had covid. Vaccine - J&J - 21. Travel - returned 21  Discussed with aunt after evaluation again - She states she did not want to talk so freely in front of his grandmother but she states that in the last two days he actually has talked and has been having some conversations with people. This morning he even asked his aunt "are you bringing me to the hospital to leave me there". Aunt thinks that there may be some chance this is behavioral to maybe evade detention as he is very smart "with a very high IQ".

## 2021-09-02 PROCEDURE — 99232 SBSQ HOSP IP/OBS MODERATE 35: CPT

## 2021-09-02 RX ADMIN — Medication 2 MILLIGRAM(S): at 06:44

## 2021-09-02 RX ADMIN — Medication 2 MILLIGRAM(S): at 12:44

## 2021-09-02 RX ADMIN — OLANZAPINE 5 MILLIGRAM(S): 15 TABLET, FILM COATED ORAL at 22:05

## 2021-09-02 NOTE — CHART NOTE - NSCHARTNOTEFT_GEN_A_CORE
CC: R hand swelling  HPI: 25 year old catatonic nonverbal male presenting today for R hand swelling. Pt was noted to have R hand swelling and had injured hand few weeks ago. Pt can't recall exactly when. Pt reports he had a wrap around the hand prior and removed it a week ago. Pt reports mild pain.    ICU Vital Signs Last 24 Hrs  T(C): 36.9 (01 Sep 2021 20:31), Max: 37.8 (01 Sep 2021 19:48)  T(F): 98.4 (01 Sep 2021 20:31), Max: 100 (01 Sep 2021 19:48)  BP: 106/65 (01 Sep 2021 07:59) (106/65 - 106/65)  HR: 114 (01 Sep 2021 07:59) (114 - 114)  ABP: --  ABP(mean): --  RR: --  SpO2: --    Physical Exam:   Strength 3/5 diminished due to 5th metacarpal pain. Tender to palpation of 5th metacarpal radiating down hand. Unable to bend 5th phalanx due to pain.     Assessment/Plan:  25 year old male with R hand pain and swelling. Pt given ice pack and tylenol prn for pain. Unclear how old injury is. Pt to be sent for X-rays in AM to r/o fracture. Short staffed to send to ED tonight for X-rays.

## 2021-09-02 NOTE — BH INPATIENT PSYCHIATRY PROGRESS NOTE - NSBHFUPINTERVALHXFT_PSY_A_CORE
Chart reviewed, including vital signs, medication administration record, lab results, and nursing notes.   Case discussed with nursing, psychology, psych rehab, and social work in team meeting.     Patient is seen in his room, in no acute distress, and minimally cooperative with interview. Patient is nonverbal, but is able to nod or shake his head in response to questions. He is unable to follow instructions to sit on his bed.   BFCRS score = 4 (mutism, immobility)

## 2021-09-02 NOTE — BH INPATIENT PSYCHIATRY PROGRESS NOTE - NSBHASSESSSUMMFT_PSY_ALL_CORE
Patient is a 25-year-old male with no past psychiatric history who comes into the hospital for catatonia for three days, referred by the crisis clinic. Over the past months, the patient has exhibited bizarre behavior which have caused him to be incarcerated, including reckless driving and possession of marijuana. He was released just six weeks ago. The patient was previously highly functional, having graduated [employer]. The patient has been acting in a bizarre way for the past several months, driving recklessly, hoarding, and living in squalid conditions. This is in the context of heavy cannabis and alcohol use.  SH: Graduated [employer]. Lives in [facility], unemployed. Lives near aunt. FH of schizophrenia.  PsyHx: Cannabis/alcohol use, past psych hospitalization.     1. Admission status  9.39 (Emergency Admission, extension complete, expires on 9/15)    2. Significant lab findings  WBC 11.25    3. Psychotropic medications  - Lorazepam 2 mg QID (catatonia)  	- Started 8/31  - Olanzapine 5 mg QHS (psychosis)  	- Started 8/31    4. Medical conditions, other medications, consults  None    5. Psychosocial interventions  - Family contacted: ROBERTA     Patient is a 25-year-old male with no past psychiatric history who comes into the hospital for catatonia for three days, referred by the crisis clinic. Over the past months, the patient has exhibited bizarre behavior which have caused him to be incarcerated, including reckless driving and possession of marijuana. He was released just six weeks ago. The patient was previously highly functional, having graduated [employer]. The patient has been acting in a bizarre way for the past several months, driving recklessly, hoarding, and living in squalid conditions. This is in the context of heavy cannabis and alcohol use.  SH: Graduated [employer]. Lives in [facility], unemployed. Lives near aunt. FH of schizophrenia.  PsyHx: Cannabis/alcohol use, past psych hospitalization.     1. Admission status  9.39 (Emergency Admission, extension complete, expires on 9/15)    2. Significant lab findings  WBC 11.25    3. Psychotropic medications  - Lorazepam 2 mg QID (catatonia)  	- Started 8/31  - Olanzapine 5 mg QHS (psychosis)  	- Started 8/31    4. Medical conditions, other medications, consults  A) Swelling/pain in R hand  - Per collateral pt may have injured his hand a few weeks ago, and he had a splint on  - Will get XR of hand 9/3    5. Psychosocial interventions  - Family contacted: ROBERTA

## 2021-09-03 PROCEDURE — 99232 SBSQ HOSP IP/OBS MODERATE 35: CPT

## 2021-09-03 PROCEDURE — 73130 X-RAY EXAM OF HAND: CPT | Mod: 26,RT

## 2021-09-03 NOTE — BH INPATIENT PSYCHIATRY PROGRESS NOTE - NSBHASSESSSUMMFT_PSY_ALL_CORE
Patient is a 25-year-old male with no past psychiatric history who comes into the hospital for catatonia for three days, referred by the crisis clinic. Over the past months, the patient has exhibited bizarre behavior which have caused him to be incarcerated, including reckless driving and possession of marijuana. He was released just six weeks ago to his grandmother's care. The patient was previously highly functional, having graduated [email or web address]. The patient has been acting in a bizarre way for the past several months, driving recklessly, hoarding, and living in squalid conditions. This is in the context of heavy cannabis and alcohol use.  SH: Graduated [employer]. Lives in [facility], unemployed. Lives near aunt. FH of schizophrenia.  PsyHx: Cannabis/alcohol use, past psych hospitalization.     The patient has refused to speak with the treatment team since hospitalization. Notably he has no symptoms of catatonia other than mutism, as he has a normal gait, no motor abnormalities, normal eye contact, and is able to follow instructions. Malingering is on the differential, as the patient has pending court dates, and collateral information also indicates that this may be the case.    1. Admission status  9.39 (Emergency Admission, extension complete, expires on 9/15)    2. Significant lab findings  WBC 11.25    3. Psychotropic medications  - Olanzapine 5 mg QHS (psychosis)  	- Started 8/31  Discontinued lorazepam 2 mg QID (catatonia)  	- Started 8/31, discontinued 9/3 due no symptoms of catatonia other than mutism    4. Medical conditions, other medications, consults  A) Swelling/pain in R hand  - Per collateral pt may have injured his hand a few weeks ago, and he had a splint on  - XR of hand performed 9/3    5. Psychosocial interventions  - Family contacted: 9/3

## 2021-09-03 NOTE — BH TREATMENT PLAN - NSTXCOMMGOALOTHER_PSY_ALL_CORE
Will utilize verbal responses to communicate with staff
Will utilize verbal responses to communicate with staff

## 2021-09-03 NOTE — BH INPATIENT PSYCHIATRY PROGRESS NOTE - NSBHFUPINTERVALHXFT_PSY_A_CORE
Chart reviewed, including vital signs, medication administration record, lab results, and nursing notes.   Case discussed with nursing, psychology, psych rehab, and social work in team meeting.     Patient is seen in the treatment room, in no acute distress, nonverbal. Patient does not respond to any of the questions of the treatment team. He was asked to write his responses, but he is unable to do so either, despite previously demonstrating ability to write. When I asked if he could point to yes or no on a piece of paper, he also declines to cooperate. Notably, the patient has no symptoms of catatonia other than mutism. The patient is seen with a normal gait, has no waxy flexibility, has normal eye contact, is eating meals, and is seen lying outside in the sun and follows instructions appropriately.

## 2021-09-03 NOTE — BH TREATMENT PLAN - NSTXPSYCHOGOAL_PSY_ALL_CORE
Make at least 5 reality based statements/requests to staff and/or peers
Make at least 5 reality based statements/requests to staff and/or peers

## 2021-09-03 NOTE — BH TREATMENT PLAN - NSTXCOMMINTERPR_PSY_ALL_CORE
Over the next 7 days, Psychiatric Rehabilitation staff will utilize group and individual psychotherapy, and psychoeducation to assist the patient in utilizing verbal responses to communicate with staff.
Over the next 7 days, Psychiatric Rehabilitation staff will utilize group and individual psychotherapy, and psychoeducation to assist the patient in utilizing verbal responses to communicate with staff.

## 2021-09-03 NOTE — BH TREATMENT PLAN - NSPTSTATEDGOAL_PSY_ALL_CORE
Pt is currently not speaking during interaction with team. 
Pt is currently not speaking during interaction with team.

## 2021-09-03 NOTE — BH TREATMENT PLAN - NSTXDCOPLKINTERSW_PSY_ALL_CORE
SW will provide pt with support, discuss aftercare plans, make appropriate referrals for mental health and discuss discharge readiness with team.
SW will provide pt with support, discuss aftercare plans, make appropriate referrals for mental health and discuss discharge readiness with team.

## 2021-09-04 PROCEDURE — 99232 SBSQ HOSP IP/OBS MODERATE 35: CPT

## 2021-09-04 NOTE — BH INPATIENT PSYCHIATRY PROGRESS NOTE - NSBHFUPINTERVALHXFT_PSY_A_CORE
Chart reviewed and case discussed with treatment team. No events reported overnight. Sleep and appetite appear fair. Patient continues to not speak. Encouraged patient to shake his head yes or no to questions and he refuses. Suspected that patient is selectively mute vs catatonia. He has no other symptoms of catatonia other than mutism. The patient is seen with a normal gait, has no waxy flexibility, has normal eye contact, is eating meals and follows instructions appropriately.

## 2021-09-04 NOTE — BH INPATIENT PSYCHIATRY PROGRESS NOTE - NSBHASSESSSUMMFT_PSY_ALL_CORE
Patient continues to be mute and refuses to communicate by writing or nodding his head.   Malingering is on the differential, as the patient has pending court dates, and collateral information also indicates that this may be the case.

## 2021-09-07 PROCEDURE — 99231 SBSQ HOSP IP/OBS SF/LOW 25: CPT | Mod: 25

## 2021-09-07 PROCEDURE — 90853 GROUP PSYCHOTHERAPY: CPT

## 2021-09-07 NOTE — ED PROVIDER NOTE - NS ED MD DISPO ISOLATION TYPES
Patient was called and notified of below results.  Patient did not have any questions at this time. Lab order placed     None

## 2021-09-07 NOTE — BH DISCHARGE NOTE NURSING/SOCIAL WORK/PSYCH REHAB - NSCDUDCCRISIS_PSY_A_CORE
[facility]  1 [phone]) [facility]-WELL ([phone])  Text "WELL" to [phone]  Website: www.[employer]/.[facility] 1 ([phone] 181-ULKQ (9422) Website: www.safehorizon.org/.National Suicide Prevention Lifeline 6 (898) 761-1866/.  [facility]  1 (042) LIFENET (683-9792)/.  [facility]  [address] [place] [address] [phone]   Hours:  Monday through Friday from 9 AM to 3 PM/.  [employer] [facility]  [employer] - [facility] Crisis Line  [phone], Option 1

## 2021-09-07 NOTE — BH DISCHARGE NOTE NURSING/SOCIAL WORK/PSYCH REHAB - NSDCADDINFO1FT_PSY_ALL_CORE
Please see that what has been provided is the campus map for the Crisis Clinic. Please arrive with your insurance and identification card.

## 2021-09-07 NOTE — BH INPATIENT PSYCHIATRY DISCHARGE NOTE - NSDCPROCEDURESFT_PSY_ALL_CORE
Xray of R hand:   Appearance of an old healed distal 5th metacarpal neck region fracture deformity. No dislocations or acute appearing fractures.    Preserved joint spaces and no joint margin erosions.    Carpal bones normally aligned.  Neutral ulnar variance.  No discrete lytic or blastic lesions.

## 2021-09-07 NOTE — BH INPATIENT PSYCHIATRY DISCHARGE NOTE - HPI (INCLUDE ILLNESS QUALITY, SEVERITY, DURATION, TIMING, CONTEXT, MODIFYING FACTORS, ASSOCIATED SIGNS AND SYMPTOMS)
From admission interview:  Patient is seen in the day room, in no acute distress, amenable to interview. Patient is nonverbal, but is able to nod or shake his head in response to questions. Denies any acute issues. No SI/HI/AVH. Patient was offered the opportunity to write in response to questions, but he declines to do so. No catalepsy, posturing, staring or other symptoms of catatonia noted.    From ED consult:  25 year old  M, domiciled alone in [place], non-caregiver, with no formal diagnosed psych history but has taken psych meds while incarcerated, possible past psych hospitalization in [place], no past SA/SI, +cannabis use, +alcohol use (none since incarceration), BIB EMS activated by the crisis clinic for catatonia evaluation.     Patient seen in intake and stared at writer. He did not answer any questions and did not blink. He was able to follow commands "change your clothes, empty out your pockets" and slowly walked to the back of the  area.   Ativan 2mg IM was ordered and given. Will reassess after dose. Collateral from Crisis Clinic and Aunt provides most information.     Attempted to evaluate patient again at 630pm. Patient continues to not be able to speak and looks around the ED. He was able to nod his head slightly after being asked if he was hearing voices.   Requested to give patient another 2mg po of Ativan.     Laura - Crisis Clinic: When patient came in he was very rigid and looked catatonic. he wouldn't engage in english or Slovak. Family said since he's been out of [facility] (was there for 6 weeks in [place]). He left one night with the car and drove and was pulled over for driving recklessly. For the last 3-4 days he looked like a vegetable. he wasn't responding to his aunt even while in the crisis clinic. He has been physically aggressive, talking about odd topics. Yesterday he was saying he wanted to throw himself out of a balcony window. Grandmother has to walk him. Usually lives alone in [place] after finishing College. He was learning Malay but noticed a change in behavior in the last year. Never has sought treatment in the past. Was taking Zyprexa 10mg and Hydroxyzine from [facility].    Rosalind (aunt)  - Pt finished [employer] and in the last year all his strange behaviors started. He used to leave the house alone, wanted to steal families money and spend outrageous amounts. He ended up in [facility] in [place] bc he was in possession of marijuana and police resist and driving recklessly but family got him out after a few days in Oct 2021. He continued to exhibit bizarre behaviors and would email friends homicidal threats and post strange things on facebook. He also accumulated over 40K in debt and family does not know how. He had also traveled around the country a lot and once on facebook he posted psychiatric pills in the past from [place] (ended up medically hospitalized in [place]) - 2021. Pt recently went back to [facility] for the second time abt 8 weeks ago as he would drive recklessly while psychotic but family left him in there for 1.5 months. They only went to get him when they noticed he stopped calling them so they were concerned. He was hearing voices in the last year as well and was hitting the walls and was aggressive. He's been in a catatonic state for the last 3 days. Not saying anything. He was sleeping a lot, eating and drinking only a little bit.  Mom  in  and dad was not in the picture but his family would check in on him while he was in [place]. Aunt thinks that maybe in  something happened with a girlfriend and with the pandemic he was somehow triggered. Aunt states he's been smoking a lot of marijuana and oil, drinking alcohol (vodka, tequila). Patient said 2 days ago that he wanted to jump out the window at Grandmothers house. Grandmother got him into the room and watched him for the last 2 days prior to bringing him to [facility]   Fam hx: maternal grandfather sisters - aunt - Schizophrenia. The only reason that family went to pick him up was because he stopped calling from [facility]. [place] - Hoarding, living in squalor with rats and mouses but patient did not want to return to [place].   Open Court Case -  - [place], Oct 4 - [place]   COVID - Never had covid. Vaccine - J&J - 21. Travel - returned 21  Discussed with aunt after evaluation again - She states she did not want to talk so freely in front of his grandmother but she states that in the last two days he actually has talked and has been having some conversations with people. This morning he even asked his aunt "are you bringing me to the hospital to leave me there". Aunt thinks that there may be some chance this is behavioral to maybe evade [facility] as he is very smart "with a very high IQ".

## 2021-09-07 NOTE — BH INPATIENT PSYCHIATRY DISCHARGE NOTE - NSDCCPCAREPLAN_GEN_ALL_CORE_FT
PRINCIPAL DISCHARGE DIAGNOSIS  Diagnosis: Catatonia  Assessment and Plan of Treatment:       SECONDARY DISCHARGE DIAGNOSES  Diagnosis: Psychosis  Assessment and Plan of Treatment:

## 2021-09-07 NOTE — BH DISCHARGE NOTE NURSING/SOCIAL WORK/PSYCH REHAB - PATIENT PORTAL LINK FT
You can access the FollowMyHealth Patient Portal offered by [facility] by registering at the following website: [email or web address]. By joining [facility]’s FollowMyHealth portal, you will also be able to view your health information using other applications (apps) compatible with our system.

## 2021-09-07 NOTE — BH DISCHARGE NOTE NURSING/SOCIAL WORK/PSYCH REHAB - NSDCPRRECOMMEND_PSY_ALL_CORE
Psychiatric rehabilitation staff recommends patient will benefit from attending Crisis Clinic at [facility] for medication management, support, and psychotherapy.

## 2021-09-07 NOTE — BH DISCHARGE NOTE NURSING/SOCIAL WORK/PSYCH REHAB - NSDCPRGOAL_PSY_ALL_CORE
During the current hospitalization, patient has been addressing psychiatric rehabilitation goals pertaining to utilizing verbal responses to communicate with staff. Patient has demonstrated limited progress towards psychiatric rehabilitation goals during the current hospitalization. Patient exhibited progress through attending in group and individual therapy with limited participation due to patient being nonverbal. Writer was unable to go over coping skills with patient due to patient being nonverbal, and not engaging in this session with writer. Patient partially met goal of utilizing verbal responses to communicate with staff. Patient was able to communicate with staff through nodding his head to yes or no questions. Patient did not complete safety plan with unit staff. Patient was compliant with medications during current hospitalization. Patient was visible on the unit at times, and was appropriate with peers and staff. Patient refused to complete a Press Ganey survey prior to discharge.

## 2021-09-07 NOTE — BH DISCHARGE NOTE NURSING/SOCIAL WORK/PSYCH REHAB - DISCHARGE INSTRUCTIONS AFTERCARE APPOINTMENTS
In order to check the location, date, or time of your aftercare appointment, please refer to your Discharge Instructions Document given to you upon leaving the hospital.  If you have lost the instructions please call [phone]

## 2021-09-07 NOTE — BH INPATIENT PSYCHIATRY DISCHARGE NOTE - OTHER PAST PSYCHIATRIC HISTORY (INCLUDE DETAILS REGARDING ONSET, COURSE OF ILLNESS, INPATIENT/OUTPATIENT TREATMENT)
no formal psych diagnosis but was given Zyprexa 10mg in retirement and Hydroxyzine.   Possibly hospitalized in [place].

## 2021-09-07 NOTE — BH INPATIENT PSYCHIATRY PROGRESS NOTE - NSBHFUPINTERVALHXFT_PSY_A_CORE
Chart reviewed, including vital signs, medication administration record, lab results, and nursing notes.   Case discussed with nursing, psychology, psych rehab, and social work in team meeting.   - Patient compliant with ADLs, no bizarre behavior noted, noted to be smiling to himself as he takes off his mask  - Continues to exhibit mutism    Patient is interviewed in the treatment room, in no acute distress, but nonverbal and uncooperative with interview. The patient exhibits mutism but no other signs of catatonia. The patient refuses to respond to any of our questions but makes appropriate eye contact. We discussed the plan for discharge, given lack of indications for psychiatric hospitalization and no change in his symptoms after discontinuing all medication.   Chart reviewed, including vital signs, medication administration record, lab results, and nursing notes.   Case discussed with nursing, psychology, psych rehab, and social work in team meeting.   - Patient compliant with ADLs, no bizarre behavior noted, noted to be smiling to himself as he takes off his mask  - Continues to exhibit mutism    Patient is interviewed in the treatment room, in no acute distress, but nonverbal and uncooperative with interview. The patient exhibits mutism but no other signs of catatonia. The patient refuses to respond to any of our questions but makes appropriate eye contact. We discussed the plan for discharge, given lack of indications for psychiatric hospitalization and no change in his symptoms after discontinuing all medication.    Called aunjennyfer Boyer at [phone]:  - States that she "knows for sure he is doing this on purpose." Feels that his "body is clear of any drugs". She feels that this is "some kind of game he is playing". Patient is very intelligent and may be trying to get something out of it but she doesn't know why he is doing this.   - States that he has an upcoming court case, and it is possible he is acting this way to avoid skilled nursing. She hopes he "changes his mind after skilled nursing".   - Patient is able to return to his grandmother's home. No guns/weapons present in the home.

## 2021-09-07 NOTE — BH INPATIENT PSYCHIATRY DISCHARGE NOTE - NSBHDCBILLING_PSY_ALL_CORE
[facility] patient and she was getting fed by grand daughter. Patient more alert today and was able to smile. Daughter at bedside and we discussed the POC. No Hospice at this time. Will follow up tomorrow. [id number] (Hospital discharge day management; 30 min or less)

## 2021-09-07 NOTE — BH INPATIENT PSYCHIATRY PROGRESS NOTE - NSBHASSESSSUMMFT_PSY_ALL_CORE
Patient continues to be mute and refuses to communicate by writing or nodding his head.   Malingering is on the differential, as the patient has pending court dates, and collateral information also indicates that this may be the case.         Patient is a 25-year-old male with no past psychiatric history who comes into the hospital for catatonia for three days, referred by the crisis clinic. Over the past months, the patient has exhibited bizarre behavior which have caused him to be incarcerated, including reckless driving and possession of marijuana. He was released just six weeks ago to his grandmother's care. The patient was previously highly functional, having graduated [employer]. The patient has been acting in a bizarre way for the past several months, driving recklessly, hoarding, and living in squalid conditions. This is in the context of heavy cannabis and alcohol use.  SH: Graduated [employer] Lives in [facility], unemployed. Lives near aunt. FH of schizophrenia.  PsyHx: Cannabis/alcohol use, past psych hospitalization.     The patient has refused to speak with the treatment team since hospitalization. Notably he has no symptoms of catatonia other than mutism, as he has a normal gait, no motor abnormalities, normal eye contact, and is able to follow instructions. Malingering is on the differential, as the patient has pending court dates, and collateral information also indicates that this may be the case.    1. Admission status  9.39 (Emergency Admission, extension complete, expires on 9/15)    2. Significant lab findings  WBC 11.25    3. Psychotropic medications  None    Discontinued Olanzapine 5 mg QHS (psychosis)  Discontinued lorazepam 2 mg QID (catatonia)    4. Medical conditions, other medications, consults  A) Swelling/pain in R hand  - Per collateral pt may have injured his hand a few weeks ago, and he had a splint on  - XR of hand performed 9/3 - healed metacarpal fx    5. Psychosocial interventions  - Family contacted: 9/3, 9/7

## 2021-09-07 NOTE — BH INPATIENT PSYCHIATRY DISCHARGE NOTE - HOSPITAL COURSE
On admission interview, the patient presented with the following signs and symptoms.  - Mutism that coincided with the start of hospitalization  - Able to nod or shake his response in response to questions  - Able to write legibly and provide the correct phone number for his sister  - Able to tend to ADLs  - No symptoms of catatonia, including catalepsy, staring, withdrawn behavior    Patient was started on olanzapine and lorazepam for psychosis and catatonia, respectively, but it was felt his presentation was ultimately inconsistent with catatonia, and medication did little to change his presenting symptoms. Medication were therefore discontinued.    Over the course of hospitalization, the patient's behavior did not change. Collateral from aunt indicates he did have grossly bizarre and disorganized behavior prior to hospitalization, which was the cause of his numerous legal charges for reckless driving. This was in the context of heavy cannabis use. Additionally he was able to speak before he was admitted. Malingering appeared increasingly likely, as the patient has pending court dates, and collateral information also indicates that this may be the case.    No medical issues, restraints, or self-injurious behavior noted during the hospitalization. At the time of discharge, the patient continued to exhibit mutism, and was uncooperative with the treatment team.    Risk assessment:  On admission, acute risk factors included: Intoxication and recent psychosocial stressor (released from snf)  Chronic risk factors included: Diagnosis of cannabis use disorder, substance use, legal problems, financial problems    The patient remains at elevated risk of suicide given chronic risk factors, which would not be reduced further by continued hospitalization and are best managed on an outpatient basis. Given that the patient did not cooperate with the treatment team and did not appear to benefit from psychiatric hospitalization, he no longer met criteria for inpatient psychiatric hospitalization and was discharged from the 1S unit.     DSM-5 diagnosis:  Malingering  Cannabis use disorder, severe    Discharge medication:  None

## 2021-09-07 NOTE — BH INPATIENT PSYCHIATRY DISCHARGE NOTE - LEGAL HISTORY
multiple arrests for driving recklessly, possession of marijuana, resisting arrest while manic Open Court Case - Sept 21 - [place], Oct 4 - [place]

## 2021-09-07 NOTE — BH INPATIENT PSYCHIATRY DISCHARGE NOTE - NSBHMETABOLIC_PSY_ALL_CORE_FT
BMI: BMI (kg/m2): 28.4 (08-31-21 @ 21:42)  HbA1c: A1C with Estimated Average Glucose Result: 5.2 % (09-01-21 @ 09:31)    Glucose:   BP: 118/69 (09-07-21 @ 07:57) (114/72 - 118/69)  Lipid Panel: Date/Time: 09-01-21 @ 09:31  Cholesterol, Serum: 168  Direct LDL: --  HDL Cholesterol, Serum: 37  Total Cholesterol/HDL Ration Measurement: --  Triglycerides, Serum: 73

## 2021-09-08 VITALS — TEMPERATURE: 97 F

## 2021-09-08 DIAGNOSIS — F12.20 CANNABIS DEPENDENCE, UNCOMPLICATED: ICD-10-CM

## 2021-09-08 PROCEDURE — 99231 SBSQ HOSP IP/OBS SF/LOW 25: CPT

## 2021-09-08 NOTE — BH INPATIENT PSYCHIATRY PROGRESS NOTE - NSDCCRITERIA_PSY_ALL_CORE
Improvement in catatonia and psychosis	

## 2021-09-08 NOTE — BH INPATIENT PSYCHIATRY PROGRESS NOTE - NSBHCHARTREVIEWVS_PSY_A_CORE FT
Vital Signs Last 24 Hrs  T(C): 36 (09-08-21 @ 06:32), Max: 36 (09-08-21 @ 06:32)  T(F): 96.8 (09-08-21 @ 06:32), Max: 96.8 (09-08-21 @ 06:32)  HR: --  BP: --  BP(mean): --  RR: --  SpO2: --    Orthostatic VS  09-08-21 @ 06:32  Lying BP: --/-- HR: --  Sitting BP: 140/83 HR: 104  Standing BP: --/-- HR: --  Site: --  Mode: --  
Vital Signs Last 24 Hrs  T(C): 35.8 (09-03-21 @ 07:29), Max: 36.4 (09-02-21 @ 20:47)  T(F): 96.5 (09-03-21 @ 07:29), Max: 97.6 (09-02-21 @ 20:47)  HR: 96 (09-03-21 @ 07:29) (96 - 96)  BP: 113/75 (09-03-21 @ 07:29) (113/75 - 113/75)  BP(mean): --  RR: --  SpO2: --    
Vital Signs Last 24 Hrs  T(C): 36.4 (09-07-21 @ 07:57), Max: 36.4 (09-06-21 @ 20:39)  T(F): 97.6 (09-07-21 @ 07:57), Max: 97.6 (09-07-21 @ 07:57)  HR: 71 (09-07-21 @ 07:57) (71 - 71)  BP: 118/69 (09-07-21 @ 07:57) (118/69 - 118/69)  BP(mean): --  RR: --  SpO2: --    
Vital Signs Last 24 Hrs  T(C): 36.3 (09-02-21 @ 07:46), Max: 37.8 (09-01-21 @ 19:48)  T(F): 97.3 (09-02-21 @ 07:46), Max: 100 (09-01-21 @ 19:48)  HR: 105 (09-02-21 @ 07:46) (105 - 105)  BP: 119/78 (09-02-21 @ 07:46) (119/78 - 119/78)  BP(mean): --  RR: --  SpO2: --    Orthostatic VS  08-31-21 @ 21:42  Lying BP: --/-- HR: --  Sitting BP: 133/74 HR: 100  Standing BP: 128/72 HR: 107  Site: --  Mode: --  
Vital Signs Last 24 Hrs  T(C): 36.6 (09-04-21 @ 07:30), Max: 36.6 (09-04-21 @ 07:30)  T(F): 97.9 (09-04-21 @ 07:30), Max: 97.9 (09-04-21 @ 07:30)  HR: 81 (09-04-21 @ 07:30) (81 - 81)  BP: 120/77 (09-04-21 @ 07:30) (120/77 - 120/77)  BP(mean): --  RR: --  SpO2: --

## 2021-09-08 NOTE — BH INPATIENT PSYCHIATRY PROGRESS NOTE - NSICDXBHPRIMARYDX_PSY_ALL_CORE
Catatonia   F06.1  
Malingering   Z76.5  
Catatonia   F06.1  

## 2021-09-08 NOTE — BH INPATIENT PSYCHIATRY PROGRESS NOTE - CURRENT MEDICATION
MEDICATIONS  (STANDING):    MEDICATIONS  (PRN):  haloperidol     Tablet 5 milliGRAM(s) Oral every 6 hours PRN agitation  haloperidol    Injectable 5 milliGRAM(s) IntraMuscular Once PRN severe agitation  LORazepam   Injectable 2 milliGRAM(s) IntraMuscular Once PRN severe agitation  
MEDICATIONS  (STANDING):  OLANZapine 5 milliGRAM(s) Oral at bedtime    MEDICATIONS  (PRN):  haloperidol     Tablet 5 milliGRAM(s) Oral every 6 hours PRN agitation  haloperidol    Injectable 5 milliGRAM(s) IntraMuscular Once PRN severe agitation  LORazepam   Injectable 2 milliGRAM(s) IntraMuscular Once PRN severe agitation  
MEDICATIONS  (STANDING):    MEDICATIONS  (PRN):  haloperidol     Tablet 5 milliGRAM(s) Oral every 6 hours PRN agitation  haloperidol    Injectable 5 milliGRAM(s) IntraMuscular Once PRN severe agitation  LORazepam   Injectable 2 milliGRAM(s) IntraMuscular Once PRN severe agitation  
MEDICATIONS  (STANDING):  LORazepam     Tablet 2 milliGRAM(s) Oral every 6 hours  OLANZapine 5 milliGRAM(s) Oral at bedtime    MEDICATIONS  (PRN):  haloperidol     Tablet 5 milliGRAM(s) Oral every 6 hours PRN agitation  haloperidol    Injectable 5 milliGRAM(s) IntraMuscular Once PRN severe agitation  LORazepam   Injectable 2 milliGRAM(s) IntraMuscular Once PRN severe agitation  
MEDICATIONS  (STANDING):    MEDICATIONS  (PRN):  haloperidol     Tablet 5 milliGRAM(s) Oral every 6 hours PRN agitation  haloperidol    Injectable 5 milliGRAM(s) IntraMuscular Once PRN severe agitation  LORazepam   Injectable 2 milliGRAM(s) IntraMuscular Once PRN severe agitation

## 2021-09-08 NOTE — BH INPATIENT PSYCHIATRY PROGRESS NOTE - NSTXCOMMGOALOTHER_PSY_ALL_CORE
Will utilize verbal responses to communicate with staff

## 2021-09-08 NOTE — BH INPATIENT PSYCHIATRY PROGRESS NOTE - NSBHMSESPEECH_PSY_A_CORE
Non-verbal: unable to assess speech further

## 2021-09-08 NOTE — BH INPATIENT PSYCHIATRY PROGRESS NOTE - NSBHFUPINTERVALHXFT_PSY_A_CORE
Chart reviewed, including vital signs, medication administration record, lab results, and nursing notes.   Case discussed with nursing, psychology, psych rehab, and social work in team meeting.   - Patient compliant with ADLs, no bizarre behavior noted, noted to be smiling to himself as he takes off his mask  - Continues to exhibit mutism    Patient is seen in the treatment room, nonverbal, and refuses to interact with the treatment team. He makes appropriate eye contact, has a normal gait, and is seen attending to his ADLs. We explain to him that he no longer benefits from psychiatric hospitalization, and the plan is for him to be discharged today.

## 2021-09-08 NOTE — BH INPATIENT PSYCHIATRY PROGRESS NOTE - NSBHFUPINTERVALCCFT_PSY_A_CORE
Patient seen for follow up for catatonia.
F/u on catatonia
F/u on catatonia
Patient seen for follow up for catatonia.
Patient seen for follow up for catatonia.

## 2021-09-08 NOTE — BH INPATIENT PSYCHIATRY PROGRESS NOTE - PRN MEDS
MEDICATIONS  (PRN):  haloperidol     Tablet 5 milliGRAM(s) Oral every 6 hours PRN agitation  haloperidol    Injectable 5 milliGRAM(s) IntraMuscular Once PRN severe agitation  LORazepam   Injectable 2 milliGRAM(s) IntraMuscular Once PRN severe agitation  

## 2021-09-08 NOTE — BH INPATIENT PSYCHIATRY PROGRESS NOTE - NSTXPSYCHOINTERMD_PSY_ALL_CORE
Titrate neuroleptic medication
Assess malingering
Assess malingering
Titrate neuroleptic medication
Assess malingering

## 2021-09-08 NOTE — BH PSYCHOLOGY - GROUP THERAPY NOTE - NSPSYCHOLGRPDBTPT_PSY_A_CORE
no self-destructive behaviors/impulses reported/Patient unable to participate due to clinical symptoms/other...
no self-destructive behaviors/impulses reported/other...
no self-destructive impulses/behaviors/Patient unable to participate due to clinical symptoms/other...
Patient unable to identify mood states/Patient unable to participate due to clinical symptoms/other...

## 2021-09-08 NOTE — BH PSYCHOLOGY - GROUP THERAPY NOTE - NSPSYCHOLGRPDBTGOAL_PSY_A_CORE
reduce mood and affective lability/improve ability to indentify feelings/improve ability to communicate feelings
reduce mood and affective lability/improve ability to indentify feelings/improve ability to communicate feelings/improve ability re: assertive/set limits/reduce vulnerability to emotional dysregualation

## 2021-09-08 NOTE — BH PSYCHOLOGY - GROUP THERAPY NOTE - NSPSYCHOLGRPDBTEMOT_PSY_A_CORE FT
Accumulating Positive Emotions in the Long Term
Build Mastery / [place] Ahead 
Build Mastery and [place] Ahead
Accumulating Positive Emotions in the Long Term

## 2021-09-08 NOTE — BH INPATIENT PSYCHIATRY PROGRESS NOTE - NSTXPSYCHOGOAL_PSY_ALL_CORE
Make at least 5 reality based statements/requests to staff and/or peers

## 2021-09-08 NOTE — BH INPATIENT PSYCHIATRY PROGRESS NOTE - NSBHASSESSSUMMFT_PSY_ALL_CORE
Patient is a 25-year-old male with no past psychiatric history who comes into the hospital for catatonia for three days, referred by the crisis clinic. Over the past months, the patient has exhibited bizarre behavior which have caused him to be incarcerated, including reckless driving and possession of marijuana. He was released just six weeks ago to his grandmother's care. The patient was previously highly functional, having graduated [employer]. The patient has been acting in a bizarre way for the past several months, driving recklessly, hoarding, and living in squalid conditions. This is in the context of heavy cannabis and alcohol use.  SH: Graduated [employer]. Lives in [facility], unemployed. Lives near aunt. FH of schizophrenia.  PsyHx: Cannabis/alcohol use, past psych hospitalization.     The patient has refused to speak with the treatment team since hospitalization. Notably he has no symptoms of catatonia other than mutism, as he has a normal gait, no motor abnormalities, normal eye contact, and is able to follow instructions. Attends to ADLs. Collateral from aunt indicates his symptoms may be exacerbated by drug use, and she feels he may be acting this way to "get out of trouble." He was also able to speak before he was admitted. Malingering is on the differential, as the patient has pending court dates, and collateral information also indicates that this may be the case.    1. Admission status  9.39 (Emergency Admission, extension complete, expires on 9/15)    2. Significant lab findings  WBC 11.25    3. Psychotropic medications  None    Discontinued Olanzapine 5 mg QHS (psychosis)  Discontinued lorazepam 2 mg QID (catatonia)    4. Medical conditions, other medications, consults  A) Swelling/pain in R hand  - Per collateral pt may have injured his hand a few weeks ago, and he had a splint on  - XR of hand performed 9/3 - healed metacarpal fx    5. Psychosocial interventions  - Family contacted: 9/3, 9/7

## 2021-09-08 NOTE — BH INPATIENT PSYCHIATRY PROGRESS NOTE - NSTXPROBCOMM_PSY_ALL_CORE
COMMUNICATION, IMPAIRED VERBAL

## 2021-09-08 NOTE — BH INPATIENT PSYCHIATRY PROGRESS NOTE - NSCGISEVERILLNESS_PSY_ALL_CORE
2 = Borderline mentally ill – subtle or suspected pathology
5 = Markedly ill - intrusive symptoms that distinctly impair social/occupational function or cause intrusive levels of distress

## 2021-09-08 NOTE — BH SAFETY PLAN - WARNING SIGN 1
Writer attempted to meet with patient multiple times between yesterday and today in order to complete safety plan. However, patient is selectively mute and refused to meet with writer.  Writer attempted to meet with patient multiple times in order to complete safety plan. However, patient is nonverbal and is only able to shake his head for yes or no questions. Patient shake his head "no" when writer attempted to go over the safety plan wit him. Therefore, safety plan cannot be completed at this time.

## 2021-09-08 NOTE — BH PSYCHOLOGY - GROUP THERAPY NOTE - NSBHPSYCHOLASSESSPROV_PSY_A_CORE
Licensed Staff Psychologist and Trainee
Licensed Staff Psychologist and Trainee
Licensed Staff Psychologist
Trainee Only

## 2021-09-08 NOTE — BH PSYCHOLOGY - GROUP THERAPY NOTE - NSPSYCHOLGRPDBTPT_PSY_A_CORE FT
DBT skills generalization group is a group in which patients review the skill taught the day before, and patients have the opportunity to troubleshoot the skill, engage in more practice, and share their experience using the skill. Today’s skills review group focused on the skill of Accumulating Positive Emotions in the Long Term by identifying values and translating those into goals and manageable action steps. Patients shared values that are important to them and how these translate into goals, as well as how they’ve begun to implement these.
DBT Group is a group in which patients learn skills to better manage their emotions and behaviors. Group begins with a mindfulness practice so that patients have an opportunity to practice observing their internal and external experiences.  Following the mindfulness exercise the group learns new skills in a didactic format. Group today focused on the “emotion regulation” module.  Specifically, patients learned Build Mastery and [place] Ahead and [place] Ahead. Patients were asked to identify an activity to engage in every day that would help increase their sense of competence and accomplishment (Build Mastery). They were also asked to identify potential difficult situations, identify skills to help manage these difficulties, and imagine coping effectively ([place] Ahead).
DBT skills generalization group is a group in which patients review the skill taught the day before, and patients have the opportunity to troubleshoot the skill, engage in more practice, and share their experience using the skill. Today’s skills review group focused on the Build Mastery and [place] Ahead skills within the Emotion Regulation module. Patients were asked to share activities in which they can engage that provide feelings of accomplishment or mastery. Patients also shared examples of [place] Ahead plans they have created to manage potential difficult situations, and feedback from leader as well as peers was provided.
DBT skills generalization group is a group in which patients review the skill taught the day before, and patients have the opportunity to troubleshoot the skill, engage in more practice, and share their experience using the skill. Today’s skills review group focused on the skill of Accumulating Positive Emotions in the Long Term by identifying values and translating those into goals and manageable action steps. Patients shared values that are important to them and how these translate into goals, as well as how they’ve begun to implement these.

## 2021-09-08 NOTE — BH PSYCHOLOGY - GROUP THERAPY NOTE - NSPSYCHOLGRPDBTINT_PSY_A_CORE
review emotion regulation homework
other...

## 2021-09-08 NOTE — BH INPATIENT PSYCHIATRY PROGRESS NOTE - NSBHMETABOLIC_PSY_ALL_CORE_FT
BMI: BMI (kg/m2): 28.4 (08-31-21 @ 21:42)  HbA1c: A1C with Estimated Average Glucose Result: 5.2 % (09-01-21 @ 09:31)    Glucose:   BP: 113/75 (09-03-21 @ 07:29) (106/65 - 148/74)  Lipid Panel: Date/Time: 09-01-21 @ 09:31  Cholesterol, Serum: 168  Direct LDL: --  HDL Cholesterol, Serum: 37  Total Cholesterol/HDL Ration Measurement: --  Triglycerides, Serum: 73  
BMI: BMI (kg/m2): 28.4 (08-31-21 @ 21:42)  HbA1c: A1C with Estimated Average Glucose Result: 5.2 % (09-01-21 @ 09:31)    Glucose:   BP: 118/69 (09-07-21 @ 07:57) (114/72 - 118/69)  Lipid Panel: Date/Time: 09-01-21 @ 09:31  Cholesterol, Serum: 168  Direct LDL: --  HDL Cholesterol, Serum: 37  Total Cholesterol/HDL Ration Measurement: --  Triglycerides, Serum: 73  
BMI: BMI (kg/m2): 28.4 (08-31-21 @ 21:42)  HbA1c: A1C with Estimated Average Glucose Result: 5.2 % (09-01-21 @ 09:31)    Glucose:   BP: 119/78 (09-02-21 @ 07:46) (106/65 - 148/74)  Lipid Panel: Date/Time: 09-01-21 @ 09:31  Cholesterol, Serum: 168  Direct LDL: --  HDL Cholesterol, Serum: 37  Total Cholesterol/HDL Ration Measurement: --  Triglycerides, Serum: 73  
BMI: BMI (kg/m2): 28.4 (08-31-21 @ 21:42)  HbA1c: A1C with Estimated Average Glucose Result: 5.2 % (09-01-21 @ 09:31)    Glucose:   BP: 120/77 (09-04-21 @ 07:30) (113/75 - 120/77)  Lipid Panel: Date/Time: 09-01-21 @ 09:31  Cholesterol, Serum: 168  Direct LDL: --  HDL Cholesterol, Serum: 37  Total Cholesterol/HDL Ration Measurement: --  Triglycerides, Serum: 73  
BMI: BMI (kg/m2): 28.4 (08-31-21 @ 21:42)  HbA1c: A1C with Estimated Average Glucose Result: 5.2 % (09-01-21 @ 09:31)    Glucose:   BP: 118/69 (09-07-21 @ 07:57) (117/22 - 118/69)  Lipid Panel: Date/Time: 09-01-21 @ 09:31  Cholesterol, Serum: 168  Direct LDL: --  HDL Cholesterol, Serum: 37  Total Cholesterol/HDL Ration Measurement: --  Triglycerides, Serum: 73

## 2021-09-08 NOTE — BH INPATIENT PSYCHIATRY PROGRESS NOTE - NSCGIIMPROVESX_PSY_ALL_CORE
4 = No change - symptoms remain essentially unchanged

## 2021-09-14 NOTE — SOCIAL WORK POST DISCHARGE FOLLOW UP NOTE - NSBHSWFOLLOWUP_PSY_ALL_CORE_FT
CARLTON has learned from the social work department that pt did not show up to [facility] app[facility] at [facility]13. CARLTON has left a message with pt aunt informing him if he would like assistance for a rescheduled appt to please call writer back. CARLTON will wait to hear back from patient before sending a letter out.

## 2021-09-16 NOTE — SOCIAL WORK POST DISCHARGE FOLLOW UP NOTE - NSBHSWFOLLOWUP_PSY_ALL_CORE_FT
SW has been in touch with pt after he left voicemail requesting a rescheduled appt with [facility]. Upon conversation with pt, pt has requested for this appointment to be remote at this time. CARLTON has informed pt that unfortunately due to pt inability to sign a HIPAA consent to send referrals to mental health programs or pt inability to confirm consent to receive mental health treatment,  had scheduled a tentative [facility] appointment. CARLTON was able to repeat the [facility] follow up appointment (provided address/contact number) and provided pt with a [facility] follow up for Wednesday 9/22 at 1:30PM. CARLTON did inform pt that after his intake appt, the crisis team will link him with services with the [facility] which can provide treatment virtually. CARLTON has informed the Crisis clinic team of follow up. CARLTON has also informed the social work department as well.

## 2021-10-08 DIAGNOSIS — F29 UNSPECIFIED PSYCHOSIS NOT DUE TO A SUBSTANCE OR KNOWN PHYSIOLOGICAL CONDITION: ICD-10-CM

## 2021-10-12 ENCOUNTER — OUTPATIENT (OUTPATIENT)
Dept: OUTPATIENT SERVICES | Facility: HOSPITAL | Age: 26
LOS: 1 days | Discharge: HOME | End: 2021-10-12
Payer: MEDICAID

## 2021-10-27 DIAGNOSIS — F25.9 SCHIZOAFFECTIVE DISORDER, UNSPECIFIED: ICD-10-CM

## 2021-10-27 DIAGNOSIS — F31.9 BIPOLAR DISORDER, UNSPECIFIED: ICD-10-CM

## 2021-12-07 PROCEDURE — 99051 MED SERV EVE/WKEND/HOLIDAY: CPT

## 2021-12-07 PROCEDURE — 90832 PSYTX W PT 30 MINUTES: CPT

## 2021-12-30 PROCEDURE — 90832 PSYTX W PT 30 MINUTES: CPT | Mod: 95

## 2022-01-18 PROCEDURE — 90832 PSYTX W PT 30 MINUTES: CPT | Mod: 95

## 2022-02-01 PROCEDURE — 90834 PSYTX W PT 45 MINUTES: CPT | Mod: 95

## 2022-02-15 PROCEDURE — 90834 PSYTX W PT 45 MINUTES: CPT | Mod: 95

## 2022-03-01 PROCEDURE — 90834 PSYTX W PT 45 MINUTES: CPT | Mod: 95

## 2022-03-15 PROCEDURE — 90832 PSYTX W PT 30 MINUTES: CPT | Mod: 95

## 2022-03-29 PROCEDURE — 90832 PSYTX W PT 30 MINUTES: CPT | Mod: 95

## 2022-04-12 PROCEDURE — 90832 PSYTX W PT 30 MINUTES: CPT | Mod: 95

## 2022-04-26 PROCEDURE — 90832 PSYTX W PT 30 MINUTES: CPT | Mod: 95

## 2022-04-26 PROCEDURE — 99051 MED SERV EVE/WKEND/HOLIDAY: CPT

## 2022-05-10 PROCEDURE — 90832 PSYTX W PT 30 MINUTES: CPT | Mod: 95

## 2022-05-24 PROCEDURE — 90832 PSYTX W PT 30 MINUTES: CPT | Mod: 95

## 2022-06-07 PROCEDURE — 90832 PSYTX W PT 30 MINUTES: CPT | Mod: 95

## 2022-06-21 PROCEDURE — 90832 PSYTX W PT 30 MINUTES: CPT | Mod: 95

## 2022-06-25 NOTE — BH SOCIAL WORK INITIAL PSYCHOSOCIAL EVALUATION - NSPROPTRIGHTSUPPORTPERSON_GEN_A_NUR
[facility]   Consult Note         Today's Date: 6/25/2022  Patient Name: Baron Feliz  Date of admission: 6/24/2022  3:47 PM  Patient's age: 65 y.o., 1964  Admission Dx: Syncope and collapse [R55]  Colitis [K52.9]    Reason for Consult:  Cardiac evaluation    Requesting Physician: Kimberly Sanchez MD    REASON FOR CONSULT:  syncope    History Obtained From:  Patient, chart, staff, records    HISTORY OF PRESENT ILLNESS:      The patient is a 65 y.o. female who is admitted to the hospital for syncope. history of coronary artery disease with MI in 2013, no stents placed, unsure about intervention. seizures on keppra, most recent episode 1 year ago, history of COPD, chronic smoker 1 pack daily x 40years. Had episode of syncope where she had lightheadedness, dizziness, and passed out for few seconds in the bathroom, while sitting. Patient denies falling down, incontinence. Admits to loss of consciousness. Admits to chest tightness prior to episodes, lasting a few seconds, 5/10, no radiation, improved with baby aspirin. Also had diarrhea, shortness of breath, nausea, some palpitations. denies tinnitus, tremors, fever, pedal edema. /75 on arrival. Now 157/99, pulse 82, on room air  Potassium 3.4, creatinine 0.6, troponin less than 6, albumin 4.0, glycemia 97, CBC normal, CT abdomen concerning for colitis    Past Medical History:   has a past medical history of Acute MI ([facility] [address] Anxiety, Chronic back pain, COPD (chronic obstructive pulmonary disease) ([facility] [address] Depression, Heart disease, Hyperlipidemia, Hypertension, and MRSA (methicillin resistant staph aureus) culture positive. Past Surgical History:   has a past surgical history that includes Tubal ligation; Tonsillectomy; Cholecystectomy, laparoscopic (11/10/2017); Cholecystectomy, laparoscopic (N/A, 11/10/2017); and back surgery.      Home Medications:    Prior to Admission medications    Medication Sig Start Date End Date Taking?  Authorizing Provider   baclofen (LIORESAL) 10 MG tablet TAKE ONE TABLET BY MOUTH ONCE NIGHTLY AS NEEDED FOR PAIN 6/20/22   THOR Whitmore CNP   BREO ELLIPTA 100-25 MCG/INH AEPB inhaler INHALE ONE DOSE BY MOUTH DAILY 5/23/22   THOR Whitmore CNP   FLUoxetine (PROZAC) 10 MG capsule TAKE ONE CAPSULE BY MOUTH DAILY  Patient not taking: Reported on 6/25/2022 5/16/22   THOR Whitmore CNP   SPIRIVA RESPIMAT 2.5 MCG/ACT AERS inhaler INHALE TWO PUFFS BY MOUTH DAILY 4/26/22   THOR Whitmore CNP   atorvastatin (LIPITOR) 40 MG tablet TAKE ONE TABLET BY MOUTH DAILY 3/23/22   THOR Whitmore CNP   buPROPion [facility]) 150 MG extended release tablet TAKE ONE TABLET BY MOUTH DAILY FOR 3 DAYS, THEN TAKE ONE TABLET BY MOUTH TWICE A DAY THEREAFTER 2/10/22   THOR Whitmore CNP   Elastic Bandages & Supports (CARPAL TUNNEL WRIST STABILIZER) [address] [facility] Wear nightly while asleep 1/20/22   THOR Whitmore CNP   levETIRAcetam (KEPPRA) 750 MG tablet Take 1 tablet by mouth nightly 9/17/21    RandMD virgen   acetaminophen (TYLENOL) 325 MG tablet Take 650 mg by mouth every 6 hours as needed for Pain    Historical Provider, MD   albuterol sulfate HFA (VENTOLIN HFA) 108 (90 Base) MCG/ACT inhaler Inhale 2 puffs into the lungs every 4 hours as needed for Wheezing 7/9/21   THOR Whitmore CNP   albuterol (PROVENTIL) (5 MG/ML) 0.5% nebulizer solution Take 0.5 mLs by nebulization 4 times daily 6/15/21   Toby Oppenheim, MD   zinc gluconate 50 MG tablet Take 50 mg by mouth daily    Historical Provider, MD   Ascorbic Acid (VITAMIN C) 250 MG tablet Take 250 mg by mouth daily    Historical Provider, MD   Multiple Vitamins-Minerals (EMERGEN-C VITAMIN C PO) Take by mouth daily    Historical Provider, MD   budesonide-formoterol (SYMBICORT) 160-4.5 MCG/ACT AERO Inhale 2 puffs into the lungs 2 times daily  Patient not taking: Reported on 11/30/2020 10/16/20 7/9/21  Lieutenant Abril MD Nebulizers (NEBULIZER COMPRESSOR) MISC Daily as needed 9/4/20   THOR Mondragon CNP   Respiratory Therapy Supplies (NEBULIZER/TUBING/MOUTHPIECE) KIT 1 kit by Does not apply route daily as needed (SOB or wheezing) 9/4/20   THOR Mondragon CNP   Handicap Placard MISC by Does not apply route Exp 1/2024 1/20/20   THOR Mondragon CNP   Cholecalciferol (VITAMIN D3) 400 units CAPS Take 1 tablet by mouth daily    Historical Provider, MD   aspirin EC 81 MG EC tablet Take 1 tablet by mouth daily 3/6/19   THOR Mondragon CNP   magnesium gluconate (MAGONATE) 500 MG tablet Take 400 mg by mouth every evening     Historical Provider, MD   vitamin B-6 (PYRIDOXINE) 100 MG tablet Take 100 mg by mouth daily    Historical Provider, MD   nitroGLYCERIN (NITROSTAT) 0.4 MG SL tablet Place 1 tablet under the tongue every 5 minutes as needed for Chest pain up to max of 3 total doses. If no relief after 1 dose, call 911. Patient not taking: Reported on 1/20/2022 1/30/18   Sade Churchill MD   Multiple Vitamins-Minerals (THERAPEUTIC MULTIVITAMIN-MINERALS) tablet Take 1 tablet by mouth daily. Historical Provider, MD   vitamin B-12 (CYANOCOBALAMIN) 1000 MCG tablet Take 1,000 mcg by mouth daily. Historical Provider, MD       aspirin EC, 81 mg, Oral, Daily    atorvastatin, 40 mg, Oral, Nightly    budesonide-formoterol, 2 puff, Inhalation, BID    buPROPion, 150 mg, Oral, Daily    FLUoxetine, 10 mg, Oral, Daily    levETIRAcetam, 750 mg, Oral, Nightly    sodium chloride flush, 5-40 mL, IntraVENous, 2 times per day      Allergies:  Sulfa antibiotics    Social History:   reports that she has been smoking cigarettes. She has a 40.00 pack-year smoking history. She has never used smokeless tobacco. She reports current drug use. Drug: Marijuana Fronie Robert). She reports that she does not drink alcohol.      Family History: family history includes Heart Disease in her father; High Blood Pressure in her father; High Cholesterol in her father; Other in her brother and mother. No h/o sudden cardiac death. REVIEW OF SYSTEMS:    · Constitutional: there has been no unanticipated weight loss. There's been No change in energy level, No change in activity level. · Eyes: No visual changes or diplopia. No scleral icterus. · ENT: No Headaches, hearing loss or vertigo. No mouth sores or sore throat. · Cardiovascular: per HPI  · Respiratory: per HPI  · Gastrointestinal: No abdominal pain, appetite loss, blood in stools. No change in bowel or bladder habits. · Genitourinary: No dysuria, trouble voiding, or hematuria. · Musculoskeletal:  No gait disturbance, No weakness or joint complaints. · Integumentary: No rash or pruritis. · Neurological: No headache, diplopia, change in muscle strength, numbness or tingling. No change in gait, balance, coordination, mood, affect, memory, mentation, behavior. · Psychiatric: No anxiety, or depression. · Endocrine: No temperature intolerance. No excessive thirst, fluid intake, or urination. No tremor. · Hematologic/Lymphatic: No abnormal bruising or bleeding, blood clots or swollen lymph nodes. · Allergic/Immunologic: No nasal congestion or hives. PHYSICAL EXAM:      BP (!) 157/99   Pulse 71   Temp 97 °F (36.1 °C) (Oral)   Resp 17   Ht 5' 7\" (1.702 m)   Wt 155 lb (70.3 kg)   SpO2 99%   BMI 24.28 kg/m²    Constitutional and General Appearance: alert, cooperative, no distress and appears stated age  HEENT: PERRL, no cervical lymphadenopathy. No masses palpable. Normal oral mucosa  Respiratory:  · Normal excursion and expansion without use of accessory muscles  · Resp Auscultation: Good respiratory effort. No for increased work of breathing.  On auscultation: clear to auscultation bilaterally  Cardiovascular:  · The apical impulse is not displaced  · Heart tones are crisp and normal. regular S1 and S2.  · Jugular venous pulsation Normal  · The carotid upstroke is normal in amplitude and contour without delay or bruit  · Peripheral pulses are symmetrical and full   Abdomen:   · No masses or tenderness  · Bowel sounds present  Extremities:  ·  No Cyanosis or Clubbing  ·  Lower extremity edema: No  ·  Skin: Warm and dry  Neurological:  · Alert and oriented. · Moves all extremities well  · No abnormalities of mood, affect, memory, mentation, or behavior are noted        EKG:    Date: 06/25/22  Reading: No acute ischemia      LAST ECHO:  NONE      LAST Stress Test:   Date of last ST: MAY 2019  Major Findings: Electrocardiographically negative Lexiscan stress study. LAST Cardiac Angiography:. NONE      Labs:     CBC:   Recent Labs     06/24/22  1738   WBC 7.6   HGB 13.3   HCT 39.5        BMP:   Recent Labs     06/24/22  1738      K 3.4*   CO2 23   BUN 9   CREATININE 0.63   LABGLOM >60   GLUCOSE 97     BNP: No results for input(s): BNP in the last 72 hours. PT/INR: No results for input(s): PROTIME, INR in the last 72 hours. APTT:No results for input(s): APTT in the last 72 hours. CARDIAC ENZYMES:No results for input(s): CKTOTAL, CKMB, CKMBINDEX, TROPONINI in the last 72 hours.   FASTING LIPID PANEL:  Lab Results   Component Value Date    HDL 63 02/11/2022    TRIG 115 11/08/2017     LIVER PROFILE:  Recent Labs     06/24/22  1738   AST 21   ALT 25   LABALBU 4.2     Troponins: Invalid input(s): TROPONIN     Other Current Problems  Patient Active Problem List   Diagnosis    Heart disease    Chronic back pain    Depression    Hyperlipidemia    CAD, multiple vessel    Asthma    Smoking    Need for vaccination    Syncope    Leg pain    Left hip pain    Chronic cholecystitis    Chest pain    Calculus of gallbladder without cholecystitis without obstruction    History of lumbar fusion    Failed back syndrome    Marijuana use    Abnormal weight loss    Allergic rhinitis    Anxiety state    Chronic midline low back pain with sciatica    Moderate episode of recurrent major depressive disorder (HCC)    Seizure ([facility] [address]    Chronic tension-type headache, intractable    Hypertension    Sinus tachycardia    Acute respiratory failure (HCC)    COPD (chronic obstructive pulmonary disease) (HCC)    COPD exacerbation (HCC)    Acute bronchitis    Headache    Syncope and collapse           IMPRESSION & Recommendations:      1. Patient with syncopal episode, with prodrome including chest tightness dizziness, nausea and diarrhea. Etiology neurologic versus neurocardiogenic versus orthostatic versus vasovagal  2. History of seizures on Keppra  3. History of COPD  4. CAD risk factors of prior MI, dyslipidemia, chronic smoking,     Plan:  1.  2.  Do stress test  3.  4.  Check orthostatics      Discussed with patient, family, and Nurse. Electronically signed by Darnell Chino MD on 6/25/2022 at 5:51 AM    Darnell Chino MD  Internal Medicine Resident, PGY- [address]; [facility], [facility]  6/25/2022, 7:15 AM         I performed a history and physical examination of the patient and discussed management with the resident. I reviewed the residents note and agree with the documented findings and plan of care. Any areas of disagreement are noted on the chart. I was personally present for the key portions of any procedures. I have documented in the chart those procedures where I was not present during the key portions. I have personally evaluated this patient and have completed at least one if not all key elements of the E/M (history, physical exam, and MDM). Additional findings are as noted. Patient had syncope after bowel movement. Prior to that she had some chest tightness. She had chest pain also 2 days ago. ECG-normal. HS trop normal. Obtain orthostatics and will obtain treadmill cardiolitie stress test. Patient has history of seizure. Primary team to consider neurology evaluation. Last seizure was last year. Counseled to quit smoking. Monitor patient on telemety.     Ronnell Kelley MD information could not be obtained

## 2022-07-05 PROCEDURE — 90832 PSYTX W PT 30 MINUTES: CPT | Mod: 95

## 2022-07-19 PROCEDURE — 90832 PSYTX W PT 30 MINUTES: CPT | Mod: 95

## 2022-08-02 PROCEDURE — 99051 MED SERV EVE/WKEND/HOLIDAY: CPT

## 2022-08-02 PROCEDURE — 90832 PSYTX W PT 30 MINUTES: CPT | Mod: 95

## 2022-08-16 PROCEDURE — 90834 PSYTX W PT 45 MINUTES: CPT | Mod: 95

## 2022-08-16 PROCEDURE — 99051 MED SERV EVE/WKEND/HOLIDAY: CPT

## 2022-08-17 NOTE — PSYCHIATRIC REHAB INITIAL EVALUATION - NSGHSEXORIENT_PSY_ALL_CORE
Unknown Ivermectin Counseling:  Patient instructed to take medication on an empty stomach with a full glass of water.  Patient informed of potential adverse effects including but not limited to nausea, diarrhea, dizziness, itching, and swelling of the extremities or lymph nodes.  The patient verbalized understanding of the proper use and possible adverse effects of ivermectin.  All of the patient's questions and concerns were addressed.

## 2022-09-01 PROCEDURE — 90832 PSYTX W PT 30 MINUTES: CPT | Mod: 95

## 2022-10-06 PROCEDURE — 90832 PSYTX W PT 30 MINUTES: CPT | Mod: 95

## 2022-10-11 PROCEDURE — 90834 PSYTX W PT 45 MINUTES: CPT | Mod: 95

## 2022-11-08 PROCEDURE — 90834 PSYTX W PT 45 MINUTES: CPT | Mod: 95

## 2022-12-06 PROCEDURE — 90834 PSYTX W PT 45 MINUTES: CPT | Mod: 95

## 2022-12-20 PROCEDURE — 90834 PSYTX W PT 45 MINUTES: CPT | Mod: 95

## 2023-02-28 PROCEDURE — 90832 PSYTX W PT 30 MINUTES: CPT | Mod: 95

## 2023-02-28 PROCEDURE — 99051 MED SERV EVE/WKEND/HOLIDAY: CPT

## 2023-03-14 PROCEDURE — 90834 PSYTX W PT 45 MINUTES: CPT | Mod: 95

## 2023-04-11 PROCEDURE — 90834 PSYTX W PT 45 MINUTES: CPT | Mod: 95

## 2023-04-11 PROCEDURE — 99051 MED SERV EVE/WKEND/HOLIDAY: CPT

## 2023-04-25 PROCEDURE — 90834 PSYTX W PT 45 MINUTES: CPT | Mod: 95

## 2023-05-09 PROCEDURE — 99051 MED SERV EVE/WKEND/HOLIDAY: CPT

## 2023-05-09 PROCEDURE — 90834 PSYTX W PT 45 MINUTES: CPT | Mod: 95

## 2023-05-23 PROCEDURE — 90834 PSYTX W PT 45 MINUTES: CPT | Mod: 95

## 2023-06-06 PROCEDURE — 90832 PSYTX W PT 30 MINUTES: CPT | Mod: 95

## 2023-06-06 PROCEDURE — 99051 MED SERV EVE/WKEND/HOLIDAY: CPT

## 2023-06-20 PROCEDURE — 90832 PSYTX W PT 30 MINUTES: CPT | Mod: 95

## 2023-07-18 PROCEDURE — 90834 PSYTX W PT 45 MINUTES: CPT | Mod: 95

## 2023-08-22 PROCEDURE — 99214 OFFICE O/P EST MOD 30 MIN: CPT | Mod: 95

## 2023-08-29 PROCEDURE — 90832 PSYTX W PT 30 MINUTES: CPT | Mod: 95

## 2023-08-29 PROCEDURE — 99051 MED SERV EVE/WKEND/HOLIDAY: CPT

## 2023-09-12 PROCEDURE — 90832 PSYTX W PT 30 MINUTES: CPT

## 2023-10-27 NOTE — BH INPATIENT PSYCHIATRY PROGRESS NOTE - NSBHMSEAFFCONG_PSY_A_CORE
Patient wife called RE: has a quarter size knot on left side of his neck dark purple in color, has been there about 3-4 days.  
Returned call to patient.  He noticed something on his neck that he is describing as a blister.  He has no pains, no bleeding and reports no trauma to the area.  He wonders if his Warfarin caused this.  He will be sending us a picture.          
Unable to assess

## 2023-11-02 NOTE — ED PROVIDER NOTE - RATE
It was my pleasure to work with you today and I appreciate you coming to see us at [facility]. I look forward to our next visit. Please be aware that you may receive a survey in the mail or electronically regarding our visit today. Please take the time to complete the survey as I am always hoping to serve you in the best way possible.     Thank you for this consideration.    Renuka Hood, ASHLEY        ---------------------------------    Vagus nerve  -ways to increase vagal tone  -Polyvagal theory      The vagus nerve is the primary connector in your autonomic nervous system. It regulates your parasympathetic nervous system, the “rest and digest” response. When stimulated, this counteracts the sympathetic response, the “fight or flight” response.  These techniques can break the cycle of sympathetic dysregulation. You need to practice these techniques regularly to increase vagal tone.    Be physically active as tolerated  Eat a healthy diet  Meditation  Yoga  Singing  Cold water exposure to skin (preferably while simultaneously experiencing warmth, such as an ice pack to the back of your neck during or following a warm shower or foot soak)  Gargling water  Laughing  Deep, slow breathing  Humming and chanting  Acupuncture  Marcell-Chi and Qi-gong        82

## 2024-09-04 NOTE — ED BEHAVIORAL HEALTH ASSESSMENT NOTE - NSBHSAALC_PSY_A_CORE FT
almost daily prior to incarceration 2 months ago- but recently incarcerated so has not drank >6 weeks No indicators present

## 2024-12-12 NOTE — BH INPATIENT PSYCHIATRY ASSESSMENT NOTE - CURRENT PASSIVE IDEATION:
Soonest appt I could find with any of the providers was middle on January.  I advised possibly ashutosh Zelaya was going to try their office.   None known

## 2025-04-18 NOTE — BH INPATIENT PSYCHIATRY ASSESSMENT NOTE - PATIENT'S CHIEF COMPLAINT
Detail Level: Simple
Concentration (Mg/Ml) Of Additional Medication: 2.5
Concentration (Mg/Ml): 40.0
Kenalog Preparation: kenalog
Consent: The risks of atrophy were reviewed with the patient.
Total Volume (Ccs): 1
Add Option For Additional Mediation: No
no response

## 2025-06-24 NOTE — BH DISCHARGE NOTE NURSING/SOCIAL WORK/PSYCH REHAB - MODE OF TRANSPORTATION
Patient is being picked up by grandmother returning to her home [address] [address]/Ambulatory
unable to assess